# Patient Record
Sex: MALE | Race: WHITE | NOT HISPANIC OR LATINO | Employment: FULL TIME | URBAN - METROPOLITAN AREA
[De-identification: names, ages, dates, MRNs, and addresses within clinical notes are randomized per-mention and may not be internally consistent; named-entity substitution may affect disease eponyms.]

---

## 2023-03-21 DIAGNOSIS — Z00.00 HEALTHCARE MAINTENANCE: ICD-10-CM

## 2023-03-21 DIAGNOSIS — E78.5 HYPERLIPIDEMIA, UNSPECIFIED HYPERLIPIDEMIA TYPE: Primary | ICD-10-CM

## 2023-03-21 PROBLEM — K64.9 HEMORRHOID: Status: ACTIVE | Noted: 2023-03-21

## 2023-03-21 PROBLEM — F90.9 ADHD: Status: ACTIVE | Noted: 2023-03-21

## 2023-03-21 PROBLEM — F41.9 ANXIETY: Status: ACTIVE | Noted: 2023-03-21

## 2023-03-21 PROBLEM — K22.70 BARRETT'S ESOPHAGUS: Status: ACTIVE | Noted: 2023-03-21

## 2023-03-21 PROBLEM — H52.4 HYPEROPIA WITH PRESBYOPIA OF BOTH EYES: Status: ACTIVE | Noted: 2023-03-21

## 2023-03-21 PROBLEM — I86.1 VARICOCELE: Status: ACTIVE | Noted: 2023-03-21

## 2023-03-21 PROBLEM — E78.1 HYPERTRIGLYCERIDEMIA: Status: ACTIVE | Noted: 2023-03-21

## 2023-03-21 PROBLEM — M65.979 SYNOVITIS OF TOE: Status: ACTIVE | Noted: 2023-03-21

## 2023-03-21 PROBLEM — R07.89 ATYPICAL CHEST PAIN: Status: ACTIVE | Noted: 2023-03-21

## 2023-03-21 PROBLEM — H18.529 ABM (ANTERIOR BASEMENT MEMBRANE) DYSTROPHY: Status: ACTIVE | Noted: 2023-03-21

## 2023-03-21 PROBLEM — H17.9 CORNEAL SCAR, LEFT EYE: Status: ACTIVE | Noted: 2023-03-21

## 2023-03-21 PROBLEM — M79.673 FOOT PAIN: Status: ACTIVE | Noted: 2023-03-21

## 2023-03-21 PROBLEM — K64.9 BLEEDING HEMORRHOIDS: Status: ACTIVE | Noted: 2023-03-21

## 2023-03-21 PROBLEM — K21.9 CHRONIC GERD: Status: ACTIVE | Noted: 2023-03-21

## 2023-03-21 PROBLEM — H52.03 HYPEROPIA WITH PRESBYOPIA OF BOTH EYES: Status: ACTIVE | Noted: 2023-03-21

## 2023-03-21 PROBLEM — K14.6 BURNING MOUTH SYNDROME: Status: ACTIVE | Noted: 2023-03-21

## 2023-03-21 PROBLEM — L98.9 SKIN LESION: Status: ACTIVE | Noted: 2023-03-21

## 2023-03-21 PROBLEM — K14.6 BURNING TONGUE: Status: ACTIVE | Noted: 2023-03-21

## 2023-03-21 PROBLEM — M25.519 SHOULDER PAIN: Status: ACTIVE | Noted: 2023-03-21

## 2023-03-21 PROBLEM — M65.9 SYNOVITIS OF TOE: Status: ACTIVE | Noted: 2023-03-21

## 2023-03-21 PROBLEM — M19.079 1ST MTP ARTHRITIS: Status: ACTIVE | Noted: 2023-03-21

## 2023-03-21 PROBLEM — K58.0 IRRITABLE BOWEL SYNDROME WITH DIARRHEA: Status: ACTIVE | Noted: 2023-03-21

## 2023-03-21 PROBLEM — I10 HYPERTENSION: Status: ACTIVE | Noted: 2023-03-21

## 2023-03-21 PROBLEM — R52 BODY ACHES: Status: ACTIVE | Noted: 2023-03-21

## 2023-03-21 PROBLEM — M77.01 GOLFERS ELBOW OF RIGHT UPPER EXTREMITY: Status: ACTIVE | Noted: 2023-03-21

## 2023-03-21 PROBLEM — M25.579 ANKLE JOINT PAIN: Status: ACTIVE | Noted: 2023-03-21

## 2023-03-21 PROBLEM — R10.9 ABDOMINAL PAIN: Status: ACTIVE | Noted: 2023-03-21

## 2023-03-21 PROBLEM — M54.50 LOW BACK PAIN: Status: ACTIVE | Noted: 2023-03-21

## 2023-03-21 PROBLEM — B35.4 TINEA CORPORIS: Status: ACTIVE | Noted: 2023-03-21

## 2023-03-21 PROBLEM — H52.13 MYOPIA, BILATERAL: Status: ACTIVE | Noted: 2023-03-21

## 2023-03-21 PROBLEM — L30.9 DERMATITIS: Status: ACTIVE | Noted: 2023-03-21

## 2023-03-21 PROBLEM — R19.7 DIARRHEA: Status: ACTIVE | Noted: 2023-03-21

## 2023-03-21 RX ORDER — AMLODIPINE BESYLATE 10 MG/1
1 TABLET ORAL DAILY
COMMUNITY
Start: 2019-08-08 | End: 2023-03-28

## 2023-03-21 RX ORDER — TRIAMCINOLONE ACETONIDE 1 MG/G
CREAM TOPICAL 3 TIMES DAILY
COMMUNITY
Start: 2022-06-23 | End: 2024-04-18 | Stop reason: ALTCHOICE

## 2023-03-21 RX ORDER — IBUPROFEN 800 MG/1
1 TABLET ORAL EVERY 6 HOURS PRN
COMMUNITY
Start: 2019-10-30 | End: 2023-06-27

## 2023-03-21 RX ORDER — HYDROCHLOROTHIAZIDE 12.5 MG/1
1 TABLET ORAL DAILY
COMMUNITY
Start: 2019-09-12 | End: 2023-05-31 | Stop reason: SDUPTHER

## 2023-03-21 RX ORDER — LISINOPRIL 10 MG/1
1 TABLET ORAL DAILY
COMMUNITY
Start: 2020-06-13 | End: 2023-03-28

## 2023-03-23 ENCOUNTER — APPOINTMENT (OUTPATIENT)
Dept: PRIMARY CARE | Facility: CLINIC | Age: 48
End: 2023-03-23
Payer: COMMERCIAL

## 2023-03-27 DIAGNOSIS — I10 HYPERTENSION, UNSPECIFIED TYPE: ICD-10-CM

## 2023-03-28 RX ORDER — AMLODIPINE BESYLATE 10 MG/1
10 TABLET ORAL DAILY
Qty: 90 TABLET | Refills: 1 | Status: SHIPPED | OUTPATIENT
Start: 2023-03-28 | End: 2023-09-13 | Stop reason: SDUPTHER

## 2023-03-28 RX ORDER — LISINOPRIL 10 MG/1
10 TABLET ORAL DAILY
Qty: 90 TABLET | Refills: 1 | Status: SHIPPED | OUTPATIENT
Start: 2023-03-28 | End: 2023-09-13 | Stop reason: SDUPTHER

## 2023-05-31 DIAGNOSIS — I15.9 SECONDARY HYPERTENSION: ICD-10-CM

## 2023-05-31 RX ORDER — HYDROCHLOROTHIAZIDE 12.5 MG/1
12.5 TABLET ORAL DAILY
Qty: 90 TABLET | Refills: 3 | Status: SHIPPED | OUTPATIENT
Start: 2023-05-31 | End: 2023-06-06 | Stop reason: SDUPTHER

## 2023-06-06 DIAGNOSIS — I15.9 SECONDARY HYPERTENSION: ICD-10-CM

## 2023-06-06 RX ORDER — HYDROCHLOROTHIAZIDE 12.5 MG/1
12.5 TABLET ORAL DAILY
Qty: 90 TABLET | Refills: 3 | Status: SHIPPED | OUTPATIENT
Start: 2023-06-06 | End: 2023-09-13 | Stop reason: SDUPTHER

## 2023-06-27 DIAGNOSIS — M25.579 ARTHRALGIA OF ANKLE, UNSPECIFIED LATERALITY: ICD-10-CM

## 2023-06-27 RX ORDER — IBUPROFEN 800 MG/1
800 TABLET ORAL EVERY 6 HOURS PRN
Qty: 60 TABLET | Refills: 1 | Status: SHIPPED | OUTPATIENT
Start: 2023-06-27 | End: 2023-09-13 | Stop reason: SDUPTHER

## 2023-09-06 ENCOUNTER — LAB (OUTPATIENT)
Dept: LAB | Facility: LAB | Age: 48
End: 2023-09-06
Payer: COMMERCIAL

## 2023-09-06 ENCOUNTER — TELEPHONE (OUTPATIENT)
Dept: PRIMARY CARE | Facility: CLINIC | Age: 48
End: 2023-09-06

## 2023-09-06 DIAGNOSIS — Z00.00 HEALTHCARE MAINTENANCE: ICD-10-CM

## 2023-09-06 DIAGNOSIS — M10.9 GOUT, UNSPECIFIED CAUSE, UNSPECIFIED CHRONICITY, UNSPECIFIED SITE: Primary | ICD-10-CM

## 2023-09-06 DIAGNOSIS — E78.5 HYPERLIPIDEMIA, UNSPECIFIED HYPERLIPIDEMIA TYPE: ICD-10-CM

## 2023-09-06 LAB
ALANINE AMINOTRANSFERASE (SGPT) (U/L) IN SER/PLAS: 11 U/L (ref 10–52)
ALBUMIN (G/DL) IN SER/PLAS: 4.7 G/DL (ref 3.4–5)
ALKALINE PHOSPHATASE (U/L) IN SER/PLAS: 44 U/L (ref 33–120)
ANION GAP IN SER/PLAS: 15 MMOL/L (ref 10–20)
ASPARTATE AMINOTRANSFERASE (SGOT) (U/L) IN SER/PLAS: 18 U/L (ref 9–39)
BASOPHILS (10*3/UL) IN BLOOD BY AUTOMATED COUNT: 0.03 X10E9/L (ref 0–0.1)
BASOPHILS/100 LEUKOCYTES IN BLOOD BY AUTOMATED COUNT: 0.5 % (ref 0–2)
BILIRUBIN TOTAL (MG/DL) IN SER/PLAS: 1.2 MG/DL (ref 0–1.2)
CALCIUM (MG/DL) IN SER/PLAS: 9.6 MG/DL (ref 8.6–10.6)
CARBON DIOXIDE, TOTAL (MMOL/L) IN SER/PLAS: 27 MMOL/L (ref 21–32)
CHLORIDE (MMOL/L) IN SER/PLAS: 101 MMOL/L (ref 98–107)
CHOLESTEROL (MG/DL) IN SER/PLAS: 215 MG/DL (ref 0–199)
CHOLESTEROL IN HDL (MG/DL) IN SER/PLAS: 67.8 MG/DL
CHOLESTEROL/HDL RATIO: 3.2
CREATININE (MG/DL) IN SER/PLAS: 0.73 MG/DL (ref 0.5–1.3)
EOSINOPHILS (10*3/UL) IN BLOOD BY AUTOMATED COUNT: 0.28 X10E9/L (ref 0–0.7)
EOSINOPHILS/100 LEUKOCYTES IN BLOOD BY AUTOMATED COUNT: 5.1 % (ref 0–6)
ERYTHROCYTE DISTRIBUTION WIDTH (RATIO) BY AUTOMATED COUNT: 11.8 % (ref 11.5–14.5)
ERYTHROCYTE MEAN CORPUSCULAR HEMOGLOBIN CONCENTRATION (G/DL) BY AUTOMATED: 33.1 G/DL (ref 32–36)
ERYTHROCYTE MEAN CORPUSCULAR VOLUME (FL) BY AUTOMATED COUNT: 95 FL (ref 80–100)
ERYTHROCYTES (10*6/UL) IN BLOOD BY AUTOMATED COUNT: 4.64 X10E12/L (ref 4.5–5.9)
GFR MALE: >90 ML/MIN/1.73M2
GLUCOSE (MG/DL) IN SER/PLAS: 93 MG/DL (ref 74–99)
HEMATOCRIT (%) IN BLOOD BY AUTOMATED COUNT: 44.1 % (ref 41–52)
HEMOGLOBIN (G/DL) IN BLOOD: 14.6 G/DL (ref 13.5–17.5)
IMMATURE GRANULOCYTES/100 LEUKOCYTES IN BLOOD BY AUTOMATED COUNT: 0.5 % (ref 0–0.9)
LDL: 103 MG/DL (ref 0–99)
LEUKOCYTES (10*3/UL) IN BLOOD BY AUTOMATED COUNT: 5.5 X10E9/L (ref 4.4–11.3)
LYMPHOCYTES (10*3/UL) IN BLOOD BY AUTOMATED COUNT: 0.94 X10E9/L (ref 1.2–4.8)
LYMPHOCYTES/100 LEUKOCYTES IN BLOOD BY AUTOMATED COUNT: 17 % (ref 13–44)
MONOCYTES (10*3/UL) IN BLOOD BY AUTOMATED COUNT: 0.5 X10E9/L (ref 0.1–1)
MONOCYTES/100 LEUKOCYTES IN BLOOD BY AUTOMATED COUNT: 9.1 % (ref 2–10)
NEUTROPHILS (10*3/UL) IN BLOOD BY AUTOMATED COUNT: 3.74 X10E9/L (ref 1.2–7.7)
NEUTROPHILS/100 LEUKOCYTES IN BLOOD BY AUTOMATED COUNT: 67.8 % (ref 40–80)
NON HDL CHOLESTEROL: 147 MG/DL
NRBC (PER 100 WBCS) BY AUTOMATED COUNT: 0 /100 WBC (ref 0–0)
PLATELETS (10*3/UL) IN BLOOD AUTOMATED COUNT: 181 X10E9/L (ref 150–450)
POTASSIUM (MMOL/L) IN SER/PLAS: 4.4 MMOL/L (ref 3.5–5.3)
PROTEIN TOTAL: 7.1 G/DL (ref 6.4–8.2)
SODIUM (MMOL/L) IN SER/PLAS: 139 MMOL/L (ref 136–145)
TRIGLYCERIDE (MG/DL) IN SER/PLAS: 221 MG/DL (ref 0–149)
UREA NITROGEN (MG/DL) IN SER/PLAS: 12 MG/DL (ref 6–23)
VLDL: 44 MG/DL (ref 0–40)

## 2023-09-06 PROCEDURE — 36415 COLL VENOUS BLD VENIPUNCTURE: CPT

## 2023-09-06 PROCEDURE — 80053 COMPREHEN METABOLIC PANEL: CPT

## 2023-09-06 PROCEDURE — 80061 LIPID PANEL: CPT

## 2023-09-06 PROCEDURE — 85025 COMPLETE CBC W/AUTO DIFF WBC: CPT

## 2023-09-06 NOTE — TELEPHONE ENCOUNTER
Patient would like to know if you could put in orders in for blood work uric acid before his appt on 9/13.

## 2023-09-08 ENCOUNTER — LAB (OUTPATIENT)
Dept: LAB | Facility: LAB | Age: 48
End: 2023-09-08
Payer: COMMERCIAL

## 2023-09-08 DIAGNOSIS — M10.9 GOUT, UNSPECIFIED CAUSE, UNSPECIFIED CHRONICITY, UNSPECIFIED SITE: ICD-10-CM

## 2023-09-08 LAB — URATE (MG/DL) IN SER/PLAS: 6.3 MG/DL (ref 4–7.5)

## 2023-09-08 PROCEDURE — 36415 COLL VENOUS BLD VENIPUNCTURE: CPT

## 2023-09-08 PROCEDURE — 84550 ASSAY OF BLOOD/URIC ACID: CPT

## 2023-09-13 ENCOUNTER — OFFICE VISIT (OUTPATIENT)
Dept: PRIMARY CARE | Facility: CLINIC | Age: 48
End: 2023-09-13
Payer: COMMERCIAL

## 2023-09-13 VITALS
BODY MASS INDEX: 29.15 KG/M2 | OXYGEN SATURATION: 98 % | SYSTOLIC BLOOD PRESSURE: 128 MMHG | DIASTOLIC BLOOD PRESSURE: 85 MMHG | WEIGHT: 209 LBS | HEART RATE: 96 BPM | TEMPERATURE: 97.8 F

## 2023-09-13 DIAGNOSIS — M25.579 ARTHRALGIA OF ANKLE, UNSPECIFIED LATERALITY: ICD-10-CM

## 2023-09-13 DIAGNOSIS — E66.3 OVERWEIGHT (BMI 25.0-29.9): ICD-10-CM

## 2023-09-13 DIAGNOSIS — I10 HYPERTENSION, UNSPECIFIED TYPE: ICD-10-CM

## 2023-09-13 DIAGNOSIS — Z00.00 HEALTHCARE MAINTENANCE: Primary | ICD-10-CM

## 2023-09-13 DIAGNOSIS — I15.9 SECONDARY HYPERTENSION: ICD-10-CM

## 2023-09-13 DIAGNOSIS — R21 RASH: ICD-10-CM

## 2023-09-13 PROCEDURE — 99396 PREV VISIT EST AGE 40-64: CPT | Performed by: STUDENT IN AN ORGANIZED HEALTH CARE EDUCATION/TRAINING PROGRAM

## 2023-09-13 PROCEDURE — 3079F DIAST BP 80-89 MM HG: CPT | Performed by: STUDENT IN AN ORGANIZED HEALTH CARE EDUCATION/TRAINING PROGRAM

## 2023-09-13 PROCEDURE — 90686 IIV4 VACC NO PRSV 0.5 ML IM: CPT | Performed by: STUDENT IN AN ORGANIZED HEALTH CARE EDUCATION/TRAINING PROGRAM

## 2023-09-13 PROCEDURE — 90471 IMMUNIZATION ADMIN: CPT | Performed by: STUDENT IN AN ORGANIZED HEALTH CARE EDUCATION/TRAINING PROGRAM

## 2023-09-13 PROCEDURE — 1036F TOBACCO NON-USER: CPT | Performed by: STUDENT IN AN ORGANIZED HEALTH CARE EDUCATION/TRAINING PROGRAM

## 2023-09-13 PROCEDURE — 3074F SYST BP LT 130 MM HG: CPT | Performed by: STUDENT IN AN ORGANIZED HEALTH CARE EDUCATION/TRAINING PROGRAM

## 2023-09-13 RX ORDER — HYDROCHLOROTHIAZIDE 12.5 MG/1
12.5 TABLET ORAL DAILY
Qty: 90 TABLET | Refills: 3 | Status: SHIPPED | OUTPATIENT
Start: 2023-09-13 | End: 2024-05-24 | Stop reason: SDUPTHER

## 2023-09-13 RX ORDER — IBUPROFEN 800 MG/1
800 TABLET ORAL EVERY 6 HOURS PRN
Qty: 60 TABLET | Refills: 1 | Status: SHIPPED | OUTPATIENT
Start: 2023-09-13 | End: 2024-05-28 | Stop reason: SDUPTHER

## 2023-09-13 RX ORDER — LISINOPRIL 10 MG/1
10 TABLET ORAL DAILY
Qty: 90 TABLET | Refills: 3 | Status: SHIPPED | OUTPATIENT
Start: 2023-09-13 | End: 2023-09-26

## 2023-09-13 RX ORDER — AMLODIPINE BESYLATE 10 MG/1
10 TABLET ORAL DAILY
Qty: 90 TABLET | Refills: 3 | Status: SHIPPED | OUTPATIENT
Start: 2023-09-13 | End: 2023-09-26

## 2023-09-13 NOTE — PROGRESS NOTES
Subjective   Patient ID: Shant Rivera is a 47 y.o. male who presents for Annual Exam.    HPI   Re: HTN - at goal. Reports no sx high or low from HTN; denies blurry vision, HA, dizziness LoC CP SoB Munoz and leg swelling     Re: gout - had a flare last week. Resolving now with his high dose NSAID therapy.     Re:  HM - due for flu shot. Colon screen UTD, repeat 2030. PSA no longer indicated.     PMHx, FHx, Social Hx, Surg Hx personally reviewed at this appointment. No pertinent findings and/or changes from prior (if applicable).     ROS: Denies wt gain/loss f/c HA LoC CP SOB NVDC. See HPI above, and scanned sheet (if applicable). All other systems are reviewed and are without complaint.     Review of Systems    Objective   /85   Pulse 96   Temp 36.6 °C (97.8 °F)   Wt 94.8 kg (209 lb)   SpO2 98%   BMI 29.15 kg/m²     Physical Exam  Gen: well developed in NAD. AAO x3.  HEENT: NC/AT. Anicteric sclera, symmetric pupils. MMM no thrush.  Neck: Soft, supple. No LAD. No goiter.   CV: RRR nl s1s2 no m/r/g  Pulm: CTAB no w/r/r, good air exchange  GI: soft NTND BS+ no hsm  Ext: WWP no edema  Neuro: II-XII grossly intact, nonfocal systemic findings  MSK: 5/5 strength b/l UE and LE  Gait: unremarkable    Lab Results   Component Value Date    WBC 5.5 09/06/2023    HGB 14.6 09/06/2023    HCT 44.1 09/06/2023     09/06/2023    CHOL 215 (H) 09/06/2023    TRIG 221 (H) 09/06/2023    HDL 67.8 09/06/2023    ALT 11 09/06/2023    AST 18 09/06/2023     09/06/2023    K 4.4 09/06/2023     09/06/2023    CREATININE 0.73 09/06/2023    BUN 12 09/06/2023    CO2 27 09/06/2023    HGBA1C 5.0 02/01/2021     par    Electrocardiogram 12 Lead  Normal sinus rhythm  Septal infarct , age undetermined  Abnormal ECG  No previous ECGs available  Confirmed by ALAINA SAUCEDO MD ([id number]) on 7/20/2021 2:00:56 PM         Assessment/Plan   # HTN: at/near goal  - continue current regimen  - routine lab work if not recent  - continue  lifestyle modifications    # Gout/Psuedogout  - PRN NSAIDs  - uric acid level is normal    # Overweight  - counselled on wt loss via diet, exercise, and other lifestyle modifications     # Health Maintenance  - routine blood work  - Colon Cancer Screening: UTD, repeat 2030  - PSA: Not yet indicated   - Immunizations:  flu shot  - AAA screening:  Not yet indicated   Problem List Items Addressed This Visit       Ankle joint pain    Relevant Medications    ibuprofen 800 mg tablet    Hypertension    Relevant Medications    amLODIPine (Norvasc) 10 mg tablet    lisinopril 10 mg tablet    hydroCHLOROthiazide (HYDRODiuril) 12.5 mg tablet     Other Visit Diagnoses       Healthcare maintenance    -  Primary    Rash        Relevant Orders    Referral to Dermatology

## 2023-09-13 NOTE — PATIENT INSTRUCTIONS
Your blood work is up to date; no need for additional draw at this appointment    I have renewed your chronic medications today     You received your flu shot today!    See me yearly for a complete physical exam, and sooner as needed for sick visits

## 2023-09-23 DIAGNOSIS — I10 HYPERTENSION, UNSPECIFIED TYPE: ICD-10-CM

## 2023-09-26 RX ORDER — LISINOPRIL 10 MG/1
10 TABLET ORAL DAILY
Qty: 90 TABLET | Refills: 3 | Status: SHIPPED | OUTPATIENT
Start: 2023-09-26

## 2023-09-26 RX ORDER — AMLODIPINE BESYLATE 10 MG/1
TABLET ORAL
Qty: 90 TABLET | Refills: 3 | Status: SHIPPED | OUTPATIENT
Start: 2023-09-26

## 2023-10-16 ENCOUNTER — HOSPITAL ENCOUNTER (OUTPATIENT)
Dept: RADIOLOGY | Facility: HOSPITAL | Age: 48
Discharge: HOME | End: 2023-10-16
Payer: COMMERCIAL

## 2023-10-16 ENCOUNTER — OFFICE VISIT (OUTPATIENT)
Dept: ORTHOPEDIC SURGERY | Facility: HOSPITAL | Age: 48
End: 2023-10-16
Payer: COMMERCIAL

## 2023-10-16 DIAGNOSIS — M25.519 SHOULDER PAIN, UNSPECIFIED CHRONICITY, UNSPECIFIED LATERALITY: ICD-10-CM

## 2023-10-16 PROCEDURE — 99214 OFFICE O/P EST MOD 30 MIN: CPT | Performed by: ORTHOPAEDIC SURGERY

## 2023-10-16 PROCEDURE — 73030 X-RAY EXAM OF SHOULDER: CPT | Mod: RT,FY

## 2023-10-16 PROCEDURE — 99204 OFFICE O/P NEW MOD 45 MIN: CPT | Performed by: ORTHOPAEDIC SURGERY

## 2023-10-16 PROCEDURE — 73030 X-RAY EXAM OF SHOULDER: CPT | Mod: RIGHT SIDE | Performed by: RADIOLOGY

## 2023-10-16 PROCEDURE — 1036F TOBACCO NON-USER: CPT | Performed by: ORTHOPAEDIC SURGERY

## 2023-10-16 NOTE — PROGRESS NOTES
Patient injured his shoulder when pulling type with his arms downward.  He felt a sudden grinding pain has had pain and recurrent popping ever since.  No serious problems with his shoulder in the past.    The patient is pleasant and cooperative.  The patient is alert and oriented ×3.  Auditory function is intact.  The patient is a good historian.  The patient is not in acute distress.  Eye exam significant for nonicteric sclera, intact ocular muscle movement.  Breathing is rhythmic symmetric and nonlabored.  Right shoulder patient has nearly full range of motion but pain with extremes of range of motion particularly internal rotation cross body adduction and a positive [place]'s test.  Motor power is well-preserved abduction internal X rotation 5/5 light touch sensation of the deltoid is intact  strength 5/5.  Peripheral edema.  Integument intact with no lesions scars lacerations abrasions or contusions.    Radiographs appear normal no fractures or Subluxation arthritic degenerative changes subacromial glenohumeral spaces are well-preserved.    Labrum tear    I am concerned the patient has a glenoid labrum tear.  It is unlikely he is going to recover with any amount of therapy.  Given the strongly positive exam I recommended further evaluation by MRI scan as an MRI arthrogram.    Patient will follow-up after scan has been obtained.

## 2023-11-09 ENCOUNTER — HOSPITAL ENCOUNTER (OUTPATIENT)
Dept: RADIOLOGY | Facility: HOSPITAL | Age: 48
Discharge: HOME | End: 2023-11-09
Payer: COMMERCIAL

## 2023-11-09 DIAGNOSIS — M25.519 SHOULDER PAIN, UNSPECIFIED CHRONICITY, UNSPECIFIED LATERALITY: ICD-10-CM

## 2023-11-09 PROCEDURE — 73222 MRI JOINT UPR EXTREM W/DYE: CPT | Mod: RIGHT SIDE | Performed by: RADIOLOGY

## 2023-11-09 PROCEDURE — 23350 INJECTION FOR SHOULDER X-RAY: CPT | Mod: RT

## 2023-11-09 PROCEDURE — A9575 INJ GADOTERATE MEGLUMI 0.1ML: HCPCS | Performed by: ORTHOPAEDIC SURGERY

## 2023-11-09 PROCEDURE — 77002 NEEDLE LOCALIZATION BY XRAY: CPT | Mod: RIGHT SIDE | Performed by: RADIOLOGY

## 2023-11-09 PROCEDURE — A9579 GAD-BASE MR CONTRAST NOS,1ML: HCPCS | Performed by: ORTHOPAEDIC SURGERY

## 2023-11-09 PROCEDURE — 23350 INJECTION FOR SHOULDER X-RAY: CPT | Mod: RIGHT SIDE | Performed by: RADIOLOGY

## 2023-11-09 PROCEDURE — 2500000004 HC RX 250 GENERAL PHARMACY W/ HCPCS (ALT 636 FOR OP/ED): Performed by: ORTHOPAEDIC SURGERY

## 2023-11-09 PROCEDURE — 2550000001 HC RX 255 CONTRASTS: Performed by: ORTHOPAEDIC SURGERY

## 2023-11-09 RX ORDER — LIDOCAINE HYDROCHLORIDE 10 MG/ML
4 INJECTION, SOLUTION EPIDURAL; INFILTRATION; INTRACAUDAL; PERINEURAL ONCE
Status: DISCONTINUED | OUTPATIENT
Start: 2023-11-09 | End: 2023-11-10 | Stop reason: HOSPADM

## 2023-11-09 RX ORDER — LIDOCAINE HYDROCHLORIDE 10 MG/ML
INJECTION INFILTRATION; PERINEURAL
Status: DISPENSED
Start: 2023-11-09 | End: 2023-11-09

## 2023-11-09 RX ORDER — GADOTERATE MEGLUMINE 376.9 MG/ML
0.1 INJECTION INTRAVENOUS
Status: COMPLETED | OUTPATIENT
Start: 2023-11-09 | End: 2023-11-09

## 2023-11-09 RX ADMIN — GADOTERATE MEGLUMINE 0.1 ML: 376.9 INJECTION INTRAVENOUS at 09:50

## 2023-11-09 RX ADMIN — IOHEXOL 4 MG: 240 INJECTION, SOLUTION INTRATHECAL; INTRAVASCULAR; INTRAVENOUS; ORAL at 11:00

## 2023-11-09 RX ADMIN — GADODIAMIDE: 287 INJECTION INTRAVENOUS at 11:00

## 2023-11-09 NOTE — PROCEDURES
Right Shoulder MR Arthrogram.      9:50 AM.  November 9th, 2023.    The risks, benefits and alternatives of the procedure were discussed with the patient. The patient was given the chance to ask questions, and all were answered prior to proceeding. At this time, written informed consent was obtained.      The patient was placed in the supine position on the fluoroscopic table and was prepped and draped in the usual sterile fashion. The [right  shoulder] joint was localized under fluoroscopy. Lidocaine, [10] ml was used for local anesthesia.      Under fluoroscopic guidance a 20 gauge needle was inserted into the [right shoulder] joint. [8] mL of a solution containing lidocaine, Omnipaque 240 iodinated contrast and Dotarem gadolinium contrast was injected into the [right ] joint.            The patient tolerated the procedure well and no immediate complication was noted.      Total fluoroscopy time was [7] sec.     Procedure was performed by [Hank Restrepo MD].

## 2023-11-10 ENCOUNTER — OFFICE VISIT (OUTPATIENT)
Dept: UROLOGY | Facility: HOSPITAL | Age: 48
End: 2023-11-10
Payer: COMMERCIAL

## 2023-11-10 ENCOUNTER — OFFICE VISIT (OUTPATIENT)
Dept: ORTHOPEDIC SURGERY | Facility: HOSPITAL | Age: 48
End: 2023-11-10
Payer: COMMERCIAL

## 2023-11-10 DIAGNOSIS — R35.0 URINARY FREQUENCY: ICD-10-CM

## 2023-11-10 DIAGNOSIS — S43.003A SUBLUXATION OF TENDON OF LONG HEAD OF BICEPS: Primary | ICD-10-CM

## 2023-11-10 DIAGNOSIS — N52.9 ERECTILE DYSFUNCTION, UNSPECIFIED ERECTILE DYSFUNCTION TYPE: ICD-10-CM

## 2023-11-10 DIAGNOSIS — N40.1 BENIGN LOCALIZED PROSTATIC HYPERPLASIA WITH LOWER URINARY TRACT SYMPTOMS (LUTS): Primary | ICD-10-CM

## 2023-11-10 PROCEDURE — 99213 OFFICE O/P EST LOW 20 MIN: CPT | Mod: 25 | Performed by: ORTHOPAEDIC SURGERY

## 2023-11-10 PROCEDURE — 1036F TOBACCO NON-USER: CPT | Performed by: ORTHOPAEDIC SURGERY

## 2023-11-10 PROCEDURE — 99214 OFFICE O/P EST MOD 30 MIN: CPT | Performed by: UROLOGY

## 2023-11-10 PROCEDURE — 99213 OFFICE O/P EST LOW 20 MIN: CPT | Performed by: ORTHOPAEDIC SURGERY

## 2023-11-10 PROCEDURE — 99204 OFFICE O/P NEW MOD 45 MIN: CPT | Performed by: UROLOGY

## 2023-11-10 PROCEDURE — 3079F DIAST BP 80-89 MM HG: CPT | Performed by: ORTHOPAEDIC SURGERY

## 2023-11-10 PROCEDURE — 1036F TOBACCO NON-USER: CPT | Performed by: UROLOGY

## 2023-11-10 PROCEDURE — 3074F SYST BP LT 130 MM HG: CPT | Performed by: UROLOGY

## 2023-11-10 PROCEDURE — 3074F SYST BP LT 130 MM HG: CPT | Performed by: ORTHOPAEDIC SURGERY

## 2023-11-10 PROCEDURE — 3079F DIAST BP 80-89 MM HG: CPT | Performed by: UROLOGY

## 2023-11-10 RX ORDER — TADALAFIL 5 MG/1
5 TABLET ORAL DAILY
Qty: 30 TABLET | Refills: 11 | Status: SHIPPED | OUTPATIENT
Start: 2023-11-10 | End: 2024-11-04

## 2023-11-10 NOTE — PROGRESS NOTES
With regard to his shoulder but intermittently has sudden pains anteriorly.  He is here to review MRI scan.    MRI scan shows subluxing biceps tendon.  Intact rotator cuff.  Partial tear of the subscapularis.    We discussed significance of the findings of the MRI scan and the significance of the problem and subluxing biceps tendon the treatment options were discussed in detail I have recommend the patient consider surgery specifically right shoulder arthroscopy with debridement and open biceps tenodesis.  Explained the procedure and the rehabilitation sequence.  The patient is interested in pursuing this after the ski season.  We will schedule sometime in the spring.  If he changes his mind going to schedule for earlier if needed.    This was dictated using voice recognition software and not corrected for grammatical or spelling errors.

## 2023-11-10 NOTE — PROGRESS NOTES
"HPI  48-year-old male being seen for bothersome urinary symptoms and issues with erections.    LUTS  Urinary symptoms include: frequency, no hematuria, no UTIs, and unhappy with his urinary symptoms  No prior surgeries  No urinary medications.  He does drink a lot of water up to bedtime. Nocturia x3-4.    Erections: not working well, naive to meds;  has noted issues since his vasectomy 8 years ago.    Stones   No prior stones    Prostate Cancer Screening:   No family history of prostate cancer    No results found for: \"PSA\"                  Current Medications:  Current Outpatient Medications   Medication Sig Dispense Refill    amLODIPine (Norvasc) 10 mg tablet TAKE 1 TABLET(10 MG) BY MOUTH EVERY DAY AS DIRECTED 90 tablet 3    hydroCHLOROthiazide (HYDRODiuril) 12.5 mg tablet Take 1 tablet (12.5 mg) by mouth once daily. 90 tablet 3    ibuprofen 800 mg tablet Take 1 tablet (800 mg) by mouth every 6 hours if needed for mild pain (1 - 3). 60 tablet 1    lisinopril 10 mg tablet TAKE 1 TABLET(10 MG) BY MOUTH EVERY DAY 90 tablet 3    tadalafil (Cialis) 5 mg tablet Take 1 tablet (5 mg) by mouth once daily. 30 tablet 11    triamcinolone (Kenalog) 0.1 % cream Apply topically 3 times a day.       No current facility-administered medications for this visit.        Active Problems:  Shant Rivera is a 48 y.o. male with the following Problems and Medications.  Patient Active Problem List   Diagnosis    1st MTP arthritis    Abdominal pain    ABM (anterior basement membrane) dystrophy    ADHD    Ankle joint pain    Anxiety    Atypical chest pain    Edgar's esophagus    Bleeding hemorrhoids    Body aches    Burning mouth syndrome    Burning tongue    Chronic GERD    Corneal scar, left eye    Dermatitis    Diarrhea    Foot pain    Golfers elbow of right upper extremity    Hemorrhoid    HLD (hyperlipidemia)    Hyperopia with presbyopia of both eyes    Hypertension    Hypertriglyceridemia    Irritable bowel syndrome with diarrhea    " Low back pain    Myopia, bilateral    Shoulder pain    Skin lesion    Synovitis of toe    Tinea corporis    Varicocele     Current Outpatient Medications   Medication Sig Dispense Refill    amLODIPine (Norvasc) 10 mg tablet TAKE 1 TABLET(10 MG) BY MOUTH EVERY DAY AS DIRECTED 90 tablet 3    hydroCHLOROthiazide (HYDRODiuril) 12.5 mg tablet Take 1 tablet (12.5 mg) by mouth once daily. 90 tablet 3    ibuprofen 800 mg tablet Take 1 tablet (800 mg) by mouth every 6 hours if needed for mild pain (1 - 3). 60 tablet 1    lisinopril 10 mg tablet TAKE 1 TABLET(10 MG) BY MOUTH EVERY DAY 90 tablet 3    tadalafil (Cialis) 5 mg tablet Take 1 tablet (5 mg) by mouth once daily. 30 tablet 11    triamcinolone (Kenalog) 0.1 % cream Apply topically 3 times a day.       No current facility-administered medications for this visit.       PMH:  Past Medical History:   Diagnosis Date    Disease of esophagus, unspecified 08/15/2013    Disorder of esophagus    Essential (primary) hypertension 05/12/2021    Hypertension    Other conditions influencing health status 03/26/2013    Attention-deficit Hyperactivity Disorder    Personal history of malignant neoplasm, unspecified     History of malignant neoplasm    Personal history of other diseases of the digestive system     History of esophageal reflux    Personal history of other diseases of the respiratory system     Personal history of asthma    Polyp of colon     Benign colonic polyp    Unspecified corneal scar and opacity     Corneal scar, left eye       PSH:  Past Surgical History:   Procedure Laterality Date    GASTRIC FUNDOPLICATION  09/03/2014    Esophagogastric Fundoplasty Nissen Fundoplication    HERNIA REPAIR  09/03/2014    Inguinal Hernia Repair    OTHER SURGICAL HISTORY  11/16/2021    Strabismus surgery       FMH:  Family History   Problem Relation Name Age of Onset    Breast cancer Mother      Brain cancer Father      Prostate cancer Maternal Grandfather      Esophageal cancer  Paternal Grandmother         SHx:  Social History     Tobacco Use    Smoking status: Former     Packs/day: 1.00     Years: 10.00     Additional pack years: 0.00     Total pack years: 10.00     Types: Cigarettes    Smokeless tobacco: Never   Substance Use Topics    Alcohol use: Yes     Alcohol/week: 21.0 standard drinks of alcohol     Types: 21 Standard drinks or equivalent per week    Drug use: Never       Allergies:  Allergies   Allergen Reactions    Penicillins Unknown           Assesment/Plan  We discussed the mechanics of voiding.  We discussed fluid management and behavioral modifications.  I recommended he reduce his fluid particularly before bedtime.  He is interested in trying medication.  We will try him on tadalafil 5 mg daily for both his urinary issues as well as his erectile issues.  Discussed side effects of contraindication to nitrates.  I will see him back in 3 months or telehealth, sooner as needed.      Scribe Attestation  By signing my name below, I, Mariely Hickman , Scribe   attest that this documentation has been prepared under the direction and in the presence of Soy Orozco MD.

## 2023-11-17 DIAGNOSIS — S43.003D: ICD-10-CM

## 2024-02-09 ENCOUNTER — APPOINTMENT (OUTPATIENT)
Dept: UROLOGY | Facility: HOSPITAL | Age: 49
End: 2024-02-09
Payer: COMMERCIAL

## 2024-02-22 ENCOUNTER — TELEMEDICINE (OUTPATIENT)
Dept: UROLOGY | Facility: HOSPITAL | Age: 49
End: 2024-02-22
Payer: COMMERCIAL

## 2024-02-22 DIAGNOSIS — N40.1 BENIGN LOCALIZED PROSTATIC HYPERPLASIA WITH LOWER URINARY TRACT SYMPTOMS (LUTS): ICD-10-CM

## 2024-02-22 DIAGNOSIS — N52.9 ERECTILE DYSFUNCTION, UNSPECIFIED ERECTILE DYSFUNCTION TYPE: ICD-10-CM

## 2024-02-22 DIAGNOSIS — R35.0 URINARY FREQUENCY: Primary | ICD-10-CM

## 2024-02-22 PROCEDURE — 1036F TOBACCO NON-USER: CPT | Performed by: UROLOGY

## 2024-02-22 PROCEDURE — 99214 OFFICE O/P EST MOD 30 MIN: CPT | Performed by: UROLOGY

## 2024-02-22 NOTE — PROGRESS NOTES
"HPI    48-year-old male being seen for bothersome urinary symptoms and issues with erections.     LUTS  Urinary symptoms include: frequency, no hematuria, no UTIs, and unhappy with his urinary symptoms  No prior surgeries  No urinary medications.  He does drink a lot of water up to bedtime. Nocturia x3-4.     Erections: not working well, naive to meds;  has noted issues since his vasectomy 8 years ago.     Stones   No prior stones     Prostate Cancer Screening:   No family history of prostate cancer    11/10/23 - seen in consult. Started on cialis 5mg for bph and ed. Discussed limiting fluids in the evening    2/22/24 - seen in follow up after started on tadalafil 5mg daily. urination is better. Urgency better, nocturia better. Erections are much better. Would like to come in for exam    No results found for: \"PSA\"      Current Medications:  Current Outpatient Medications   Medication Sig Dispense Refill    amLODIPine (Norvasc) 10 mg tablet TAKE 1 TABLET(10 MG) BY MOUTH EVERY DAY AS DIRECTED 90 tablet 3    hydroCHLOROthiazide (HYDRODiuril) 12.5 mg tablet Take 1 tablet (12.5 mg) by mouth once daily. 90 tablet 3    ibuprofen 800 mg tablet Take 1 tablet (800 mg) by mouth every 6 hours if needed for mild pain (1 - 3). 60 tablet 1    lisinopril 10 mg tablet TAKE 1 TABLET(10 MG) BY MOUTH EVERY DAY 90 tablet 3    tadalafil (Cialis) 5 mg tablet Take 1 tablet (5 mg) by mouth once daily. 30 tablet 11    triamcinolone (Kenalog) 0.1 % cream Apply topically 3 times a day.       No current facility-administered medications for this visit.        Active Problems:  Shant Rivera is a 48 y.o. male with the following Problems and Medications.  Patient Active Problem List   Diagnosis    1st MTP arthritis    Abdominal pain    ABM (anterior basement membrane) dystrophy    ADHD    Ankle joint pain    Anxiety    Atypical chest pain    Edgar's esophagus    Bleeding hemorrhoids    Body aches    Burning mouth syndrome    Burning tongue    " Chronic GERD    Corneal scar, left eye    Dermatitis    Diarrhea    Foot pain    Golfers elbow of right upper extremity    Hemorrhoid    HLD (hyperlipidemia)    Hyperopia with presbyopia of both eyes    Hypertension    Hypertriglyceridemia    Irritable bowel syndrome with diarrhea    Low back pain    Myopia, bilateral    Shoulder pain    Skin lesion    Synovitis of toe    Tinea corporis    Varicocele    Shoulder subluxation, unspecified laterality, subsequent encounter     Current Outpatient Medications   Medication Sig Dispense Refill    amLODIPine (Norvasc) 10 mg tablet TAKE 1 TABLET(10 MG) BY MOUTH EVERY DAY AS DIRECTED 90 tablet 3    hydroCHLOROthiazide (HYDRODiuril) 12.5 mg tablet Take 1 tablet (12.5 mg) by mouth once daily. 90 tablet 3    ibuprofen 800 mg tablet Take 1 tablet (800 mg) by mouth every 6 hours if needed for mild pain (1 - 3). 60 tablet 1    lisinopril 10 mg tablet TAKE 1 TABLET(10 MG) BY MOUTH EVERY DAY 90 tablet 3    tadalafil (Cialis) 5 mg tablet Take 1 tablet (5 mg) by mouth once daily. 30 tablet 11    triamcinolone (Kenalog) 0.1 % cream Apply topically 3 times a day.       No current facility-administered medications for this visit.       PMH:  Past Medical History:   Diagnosis Date    Disease of esophagus, unspecified 08/15/2013    Disorder of esophagus    Essential (primary) hypertension 05/12/2021    Hypertension    Other conditions influencing health status 03/26/2013    Attention-deficit Hyperactivity Disorder    Personal history of malignant neoplasm, unspecified     History of malignant neoplasm    Personal history of other diseases of the digestive system     History of esophageal reflux    Personal history of other diseases of the respiratory system     Personal history of asthma    Polyp of colon     Benign colonic polyp    Unspecified corneal scar and opacity     Corneal scar, left eye       PSH:  Past Surgical History:   Procedure Laterality Date    GASTRIC FUNDOPLICATION   09/03/2014    Esophagogastric Fundoplasty Nissen Fundoplication    HERNIA REPAIR  09/03/2014    Inguinal Hernia Repair    OTHER SURGICAL HISTORY  11/16/2021    Strabismus surgery       FMH:  Family History   Problem Relation Name Age of Onset    Breast cancer Mother      Brain cancer Father      Prostate cancer Maternal Grandfather      Esophageal cancer Paternal Grandmother         SHx:  Social History     Tobacco Use    Smoking status: Former     Packs/day: 1.00     Years: 10.00     Additional pack years: 0.00     Total pack years: 10.00     Types: Cigarettes    Smokeless tobacco: Never   Substance Use Topics    Alcohol use: Yes     Alcohol/week: 21.0 standard drinks of alcohol     Types: 21 Standard drinks or equivalent per week    Drug use: Never       Allergies:  Allergies   Allergen Reactions    Penicillins Unknown       Assessment/Plan  Doing much better on tadalafil 5mg daily, both in terms of erectile function and urinary symptoms. Happy where he is at. Would like to come in for an exam, will arrange in next month or so.         Scribe Attestation  By signing my name below, I, Bria Lilly, attest that this documentation  has been prepared under the direction and in the presence of Soy Orozco MD.

## 2024-03-18 ENCOUNTER — PRE-ADMISSION TESTING (OUTPATIENT)
Dept: PREADMISSION TESTING | Facility: HOSPITAL | Age: 49
End: 2024-03-18
Payer: COMMERCIAL

## 2024-03-20 NOTE — PROGRESS NOTES
HPI    Shant Rivera is a 48 y.o. male who was initially evaluated for hemorrhoids. He was seen by Dr. Cervantes about 10 years ago without intervention and his hemorrhoids resolved on their own. On exam he had a skin tag suggestive of a fissure. On 7/30/21 he underwent EUA, excision of skin tag and biopsy. Pathology showing irritated and inflamed tissue. Saw Dr. Burgos about RUQ pain and HIDA scan was ordered.      He was last seen 5/2023 and at that time he had a chronic appearing area of lichenified skin in anterior perineum despite steroid treatment. He was referred to dermatology.    He is having irritation and bleeding with wiping. He is having 1-2 BM's per day. Stools are liquid. He is not taking any fiber powder. He spends a prolonged period of time on the toilet and sits on his phone. He did see dermatology and they gave him tacrolimus cream. He reports an improvement in his symptoms with the tacrolimus cream.      Colonoscopy 6/2020 ([facility])- Normal     Past Medical History:   Diagnosis Date    Disease of esophagus, unspecified 08/15/2013    Disorder of esophagus    Essential (primary) hypertension 05/12/2021    Hypertension    Other conditions influencing health status 03/26/2013    Attention-deficit Hyperactivity Disorder    Personal history of malignant neoplasm, unspecified     History of malignant neoplasm    Personal history of other diseases of the digestive system     History of esophageal reflux    Personal history of other diseases of the respiratory system     Personal history of asthma    Polyp of colon     Benign colonic polyp    Unspecified corneal scar and opacity     Corneal scar, left eye       Past Surgical History:   Procedure Laterality Date    GASTRIC FUNDOPLICATION  09/03/2014    Esophagogastric Fundoplasty Nissen Fundoplication    HERNIA REPAIR  09/03/2014    Inguinal Hernia Repair    OTHER SURGICAL HISTORY  11/16/2021    Strabismus surgery       Allergies   Allergen Reactions     Penicillins Unknown       Review of Systems   Constitutional:  Negative for activity change and appetite change.   Gastrointestinal:  Positive for anal bleeding and diarrhea.       Physical Exam  Vitals reviewed. Exam conducted with a chaperone present.   Constitutional:       Appearance: Normal appearance. He is normal weight.   Neurological:      Mental Status: He is alert.       Perineum:   Anterior area of lichen sclerosis with nonhealing biopsy site  JANAE normal     Assessment and Plan:   Nonhealing of biopsy. Ongoing diarrhea.   Discussed options including perineal hygiene (some steroid cream, aquaphor, gauze between buttocks) to minimize friction and moisture.   Discussed potential for excision with closure including risk of a larger non-healing wound as second choice if needed.

## 2024-03-21 ENCOUNTER — OFFICE VISIT (OUTPATIENT)
Dept: UROLOGY | Facility: HOSPITAL | Age: 49
End: 2024-03-21
Payer: COMMERCIAL

## 2024-03-21 DIAGNOSIS — K62.89 PAIN, ANAL: ICD-10-CM

## 2024-03-21 DIAGNOSIS — N40.1 BENIGN PROSTATIC HYPERPLASIA WITH URINARY FREQUENCY: Primary | ICD-10-CM

## 2024-03-21 DIAGNOSIS — R35.0 BENIGN PROSTATIC HYPERPLASIA WITH URINARY FREQUENCY: Primary | ICD-10-CM

## 2024-03-21 PROCEDURE — 99213 OFFICE O/P EST LOW 20 MIN: CPT | Performed by: UROLOGY

## 2024-03-21 PROCEDURE — 1036F TOBACCO NON-USER: CPT | Performed by: UROLOGY

## 2024-03-21 NOTE — PROGRESS NOTES
HPI  48-year-old male being seen for bothersome urinary symptoms and issues with erections.     LUTS  Urinary symptoms include: frequency, no hematuria, no UTIs, and unhappy with his urinary symptoms  No prior surgeries  No urinary medications.  He does drink a lot of water up to bedtime. Nocturia x3-4.     Erections: not working well, naive to meds;  has noted issues since his vasectomy 8 years ago.     Stones   No prior stones     Prostate Cancer Screening:   No family history of prostate cancer    11/10/23 - seen in consult. Started on cialis 5mg for bph and ed. Discussed limiting fluids in the evening    2/22/24 - seen in follow up after started on tadalafil 5mg daily. urination is better. Urgency better, nocturia better. Erections are much better. Would like to come in for exam    3/21/24 - seen today for exam, per patients request. Doing very well, no interval changes. History of hemorrhoid removal a few years ago, having pain in the anal area    Current Medications:  Current Outpatient Medications   Medication Sig Dispense Refill    amLODIPine (Norvasc) 10 mg tablet TAKE 1 TABLET(10 MG) BY MOUTH EVERY DAY AS DIRECTED 90 tablet 3    hydroCHLOROthiazide (HYDRODiuril) 12.5 mg tablet Take 1 tablet (12.5 mg) by mouth once daily. 90 tablet 3    ibuprofen 800 mg tablet Take 1 tablet (800 mg) by mouth every 6 hours if needed for mild pain (1 - 3). 60 tablet 1    lisinopril 10 mg tablet TAKE 1 TABLET(10 MG) BY MOUTH EVERY DAY 90 tablet 3    tadalafil (Cialis) 5 mg tablet Take 1 tablet (5 mg) by mouth once daily. 30 tablet 11    triamcinolone (Kenalog) 0.1 % cream Apply topically 3 times a day.       No current facility-administered medications for this visit.        Active Problems:  Shant Rivera is a 48 y.o. male with the following Problems and Medications.  Patient Active Problem List   Diagnosis    1st MTP arthritis    Abdominal pain    ABM (anterior basement membrane) dystrophy    ADHD    Ankle joint pain     Anxiety    Atypical chest pain    Edgar's esophagus    Bleeding hemorrhoids    Body aches    Burning mouth syndrome    Burning tongue    Chronic GERD    Corneal scar, left eye    Dermatitis    Diarrhea    Foot pain    Golfers elbow of right upper extremity    Hemorrhoid    HLD (hyperlipidemia)    Hyperopia with presbyopia of both eyes    Hypertension    Hypertriglyceridemia    Irritable bowel syndrome with diarrhea    Low back pain    Myopia, bilateral    Shoulder pain    Skin lesion    Synovitis of toe    Tinea corporis    Varicocele    Shoulder subluxation, unspecified laterality, subsequent encounter     Current Outpatient Medications   Medication Sig Dispense Refill    amLODIPine (Norvasc) 10 mg tablet TAKE 1 TABLET(10 MG) BY MOUTH EVERY DAY AS DIRECTED 90 tablet 3    hydroCHLOROthiazide (HYDRODiuril) 12.5 mg tablet Take 1 tablet (12.5 mg) by mouth once daily. 90 tablet 3    ibuprofen 800 mg tablet Take 1 tablet (800 mg) by mouth every 6 hours if needed for mild pain (1 - 3). 60 tablet 1    lisinopril 10 mg tablet TAKE 1 TABLET(10 MG) BY MOUTH EVERY DAY 90 tablet 3    tadalafil (Cialis) 5 mg tablet Take 1 tablet (5 mg) by mouth once daily. 30 tablet 11    triamcinolone (Kenalog) 0.1 % cream Apply topically 3 times a day.       No current facility-administered medications for this visit.       PMH:  Past Medical History:   Diagnosis Date    Disease of esophagus, unspecified 08/15/2013    Disorder of esophagus    Essential (primary) hypertension 05/12/2021    Hypertension    Other conditions influencing health status 03/26/2013    Attention-deficit Hyperactivity Disorder    Personal history of malignant neoplasm, unspecified     History of malignant neoplasm    Personal history of other diseases of the digestive system     History of esophageal reflux    Personal history of other diseases of the respiratory system     Personal history of asthma    Polyp of colon     Benign colonic polyp    Unspecified corneal  scar and opacity     Corneal scar, left eye       PSH:  Past Surgical History:   Procedure Laterality Date    GASTRIC FUNDOPLICATION  09/03/2014    Esophagogastric Fundoplasty Nissen Fundoplication    HERNIA REPAIR  09/03/2014    Inguinal Hernia Repair    OTHER SURGICAL HISTORY  11/16/2021    Strabismus surgery       FMH:  Family History   Problem Relation Name Age of Onset    Breast cancer Mother      Brain cancer Father      Prostate cancer Maternal Grandfather      Esophageal cancer Paternal Grandmother         SHx:  Social History     Tobacco Use    Smoking status: Former     Packs/day: 1.00     Years: 10.00     Additional pack years: 0.00     Total pack years: 10.00     Types: Cigarettes    Smokeless tobacco: Never   Substance Use Topics    Alcohol use: Yes     Alcohol/week: 21.0 standard drinks of alcohol     Types: 21 Standard drinks or equivalent per week    Drug use: Never       Allergies:  Allergies   Allergen Reactions    Penicillins Unknown     Physical Exam  Appears to have an anal fissue within a hemorrhoid, tender on exam, reproduces his symptoms    Assessment/Plan  Patient seen today for exam, he has what I believe to be an anal fissure located at the sphincter. He is seeing colorectal tomorrow to address this.    Patient is otherwise doing very well on his medication. We will follow up in 6 months via [facility] for a symptom check.    Scribe Attestation  By signing my name below, I, Bria Lilly, attest that this documentation  has been prepared under the direction and in the presence of Soy Orozco MD.

## 2024-03-22 ENCOUNTER — OFFICE VISIT (OUTPATIENT)
Dept: SURGERY | Facility: CLINIC | Age: 49
End: 2024-03-22
Payer: COMMERCIAL

## 2024-03-22 VITALS — DIASTOLIC BLOOD PRESSURE: 85 MMHG | SYSTOLIC BLOOD PRESSURE: 136 MMHG | HEART RATE: 82 BPM

## 2024-03-22 DIAGNOSIS — L90.0 LICHEN SCLEROSUS OF ANUS: Primary | ICD-10-CM

## 2024-03-22 PROCEDURE — 3079F DIAST BP 80-89 MM HG: CPT | Performed by: SURGERY

## 2024-03-22 PROCEDURE — 99214 OFFICE O/P EST MOD 30 MIN: CPT | Performed by: SURGERY

## 2024-03-22 PROCEDURE — 3075F SYST BP GE 130 - 139MM HG: CPT | Performed by: SURGERY

## 2024-03-22 PROCEDURE — 1036F TOBACCO NON-USER: CPT | Performed by: SURGERY

## 2024-03-22 ASSESSMENT — ENCOUNTER SYMPTOMS
ANAL BLEEDING: 1
APPETITE CHANGE: 0
DIARRHEA: 1
ACTIVITY CHANGE: 0

## 2024-04-10 ENCOUNTER — APPOINTMENT (OUTPATIENT)
Dept: ORTHOPEDIC SURGERY | Facility: HOSPITAL | Age: 49
End: 2024-04-10
Payer: COMMERCIAL

## 2024-04-18 ENCOUNTER — LAB (OUTPATIENT)
Dept: LAB | Facility: LAB | Age: 49
End: 2024-04-18
Payer: COMMERCIAL

## 2024-04-18 ENCOUNTER — PRE-ADMISSION TESTING (OUTPATIENT)
Dept: PREADMISSION TESTING | Facility: HOSPITAL | Age: 49
End: 2024-04-18
Payer: COMMERCIAL

## 2024-04-18 VITALS
HEIGHT: 71 IN | BODY MASS INDEX: 29.71 KG/M2 | OXYGEN SATURATION: 96 % | DIASTOLIC BLOOD PRESSURE: 81 MMHG | TEMPERATURE: 98.6 F | SYSTOLIC BLOOD PRESSURE: 135 MMHG | RESPIRATION RATE: 16 BRPM | WEIGHT: 212.19 LBS | HEART RATE: 71 BPM

## 2024-04-18 DIAGNOSIS — M25.519 SHOULDER PAIN, UNSPECIFIED CHRONICITY, UNSPECIFIED LATERALITY: ICD-10-CM

## 2024-04-18 DIAGNOSIS — Z01.818 PREOPERATIVE TESTING: Primary | ICD-10-CM

## 2024-04-18 DIAGNOSIS — Z01.818 PREOPERATIVE TESTING: ICD-10-CM

## 2024-04-18 LAB
ANION GAP SERPL CALC-SCNC: 15 MMOL/L (ref 10–20)
BASOPHILS # BLD AUTO: 0.05 X10*3/UL (ref 0–0.1)
BASOPHILS NFR BLD AUTO: 1 %
BUN SERPL-MCNC: 17 MG/DL (ref 6–23)
CALCIUM SERPL-MCNC: 9.2 MG/DL (ref 8.6–10.3)
CHLORIDE SERPL-SCNC: 99 MMOL/L (ref 98–107)
CO2 SERPL-SCNC: 26 MMOL/L (ref 21–32)
CREAT SERPL-MCNC: 0.77 MG/DL (ref 0.5–1.3)
EGFRCR SERPLBLD CKD-EPI 2021: >90 ML/MIN/1.73M*2
EOSINOPHIL # BLD AUTO: 0.28 X10*3/UL (ref 0–0.7)
EOSINOPHIL NFR BLD AUTO: 5.6 %
ERYTHROCYTE [DISTWIDTH] IN BLOOD BY AUTOMATED COUNT: 11.1 % (ref 11.5–14.5)
GLUCOSE SERPL-MCNC: 99 MG/DL (ref 74–99)
HCT VFR BLD AUTO: 42.9 % (ref 41–52)
HGB BLD-MCNC: 14.5 G/DL (ref 13.5–17.5)
IMM GRANULOCYTES # BLD AUTO: 0.07 X10*3/UL (ref 0–0.7)
IMM GRANULOCYTES NFR BLD AUTO: 1.4 % (ref 0–0.9)
LYMPHOCYTES # BLD AUTO: 0.77 X10*3/UL (ref 1.2–4.8)
LYMPHOCYTES NFR BLD AUTO: 15.4 %
MCH RBC QN AUTO: 30.7 PG (ref 26–34)
MCHC RBC AUTO-ENTMCNC: 33.8 G/DL (ref 32–36)
MCV RBC AUTO: 91 FL (ref 80–100)
MONOCYTES # BLD AUTO: 0.48 X10*3/UL (ref 0.1–1)
MONOCYTES NFR BLD AUTO: 9.6 %
NEUTROPHILS # BLD AUTO: 3.35 X10*3/UL (ref 1.2–7.7)
NEUTROPHILS NFR BLD AUTO: 67 %
NRBC BLD-RTO: ABNORMAL /100{WBCS}
PLATELET # BLD AUTO: 174 X10*3/UL (ref 150–450)
POTASSIUM SERPL-SCNC: 4.1 MMOL/L (ref 3.5–5.3)
RBC # BLD AUTO: 4.73 X10*6/UL (ref 4.5–5.9)
SODIUM SERPL-SCNC: 136 MMOL/L (ref 136–145)
WBC # BLD AUTO: 5 X10*3/UL (ref 4.4–11.3)

## 2024-04-18 PROCEDURE — 80048 BASIC METABOLIC PNL TOTAL CA: CPT

## 2024-04-18 PROCEDURE — 85025 COMPLETE CBC W/AUTO DIFF WBC: CPT

## 2024-04-18 PROCEDURE — 99214 OFFICE O/P EST MOD 30 MIN: CPT | Performed by: NURSE PRACTITIONER

## 2024-04-18 PROCEDURE — 36415 COLL VENOUS BLD VENIPUNCTURE: CPT

## 2024-04-18 ASSESSMENT — ENCOUNTER SYMPTOMS
CONSTITUTIONAL NEGATIVE: 1
EYES NEGATIVE: 1
ROS GI COMMENTS: GERD
HEMATOLOGIC/LYMPHATIC NEGATIVE: 1
NEUROLOGICAL NEGATIVE: 1
ALLERGIC/IMMUNOLOGIC NEGATIVE: 1

## 2024-04-18 ASSESSMENT — PAIN - FUNCTIONAL ASSESSMENT: PAIN_FUNCTIONAL_ASSESSMENT: 0-10

## 2024-04-18 ASSESSMENT — PAIN SCALES - GENERAL: PAINLEVEL_OUTOF10: 0 - NO PAIN

## 2024-04-18 NOTE — CPM/PAT H&P
CPM/PAT Evaluation     Shant Rivera is a 48 y.o. male   Chief Complaint: hip pain    HPI:  Patient is a 49 y/o alert and oriented male coming in for PAT for a scheduled Total Hip Arthroplasty on 5/2/24 with Dr. Burgos.    The patient reports he has an intermittent popping in his right shoulder causing sharp pain that lasts a few seconds.  He states laying on his elbow can exacerbate the pain.  He states he has no numbness, tingling or weakness in his right arm.  No decreased ROM.  He has not had any recent injections or physical therapy.  Patient denies chest pain, SOB, COLLIER and NVDC.    Patient also denies Hx: DVT/PE.    Current medications were reviewed and a presurgical mediation schedule was provided.    He has no questions at this time.   Past Medical History:   Diagnosis Date    Disease of esophagus, unspecified 08/15/2013    Disorder of esophagus    Essential (primary) hypertension 05/12/2021    Hypertension    Other conditions influencing health status 03/26/2013    Attention-deficit Hyperactivity Disorder    Personal history of malignant neoplasm, unspecified     History of malignant neoplasm    Personal history of other diseases of the digestive system     History of esophageal reflux    Personal history of other diseases of the respiratory system     Personal history of asthma    Polyp of colon     Benign colonic polyp    Unspecified corneal scar and opacity     Corneal scar, left eye      Past Surgical History:   Procedure Laterality Date    GASTRIC FUNDOPLICATION  09/03/2014    Esophagogastric Fundoplasty Nissen Fundoplication    HERNIA REPAIR  09/03/2014    Inguinal Hernia Repair    OTHER SURGICAL HISTORY  11/16/2021    Strabismus surgery        Allergies   Allergen Reactions    Penicillins Unknown        Current Outpatient Medications on File Prior to Visit   Medication Sig Dispense Refill    amLODIPine (Norvasc) 10 mg tablet TAKE 1 TABLET(10 MG) BY MOUTH EVERY DAY AS DIRECTED 90 tablet 3     hydroCHLOROthiazide (HYDRODiuril) 12.5 mg tablet Take 1 tablet (12.5 mg) by mouth once daily. 90 tablet 3    ibuprofen 800 mg tablet Take 1 tablet (800 mg) by mouth every 6 hours if needed for mild pain (1 - 3). 60 tablet 1    lisinopril 10 mg tablet TAKE 1 TABLET(10 MG) BY MOUTH EVERY DAY 90 tablet 3    tadalafil (Cialis) 5 mg tablet Take 1 tablet (5 mg) by mouth once daily. 30 tablet 11    triamcinolone (Kenalog) 0.1 % cream Apply topically 3 times a day.       No current facility-administered medications on file prior to visit.       Review of Systems   Constitutional: Negative.    HENT: Negative.     Eyes: Negative.    Respiratory:          Hx Asthma - stable no flares   Cardiovascular:         Hypertension - stable   Gastrointestinal:         GERD   Genitourinary:         BPH w/ LUTS   Musculoskeletal:         Gout  Shoulder subluxation   Skin: Negative.    Allergic/Immunologic: Negative.    Neurological: Negative.    Hematological: Negative.    Psychiatric/Behavioral:          ADHD   stable      Physical Exam  Vitals and nursing note reviewed.   Constitutional:       Appearance: Normal appearance.   HENT:      Head: Normocephalic and atraumatic.      Mouth/Throat:      Mouth: Mucous membranes are moist.      Pharynx: Oropharynx is clear.   Eyes:      Pupils: Pupils are equal, round, and reactive to light.   Cardiovascular:      Rate and Rhythm: Normal rate and regular rhythm.      Heart sounds: Normal heart sounds.   Pulmonary:      Effort: Pulmonary effort is normal.      Breath sounds: Normal breath sounds.   Abdominal:      General: Bowel sounds are normal.      Palpations: Abdomen is soft.   Musculoskeletal:         General: Normal range of motion.      Cervical back: Normal range of motion.   Skin:     General: Skin is warm and dry.   Neurological:      General: No focal deficit present.      Mental Status: He is alert and oriented to person, place, and time.   Psychiatric:         Mood and Affect: Mood  normal.         Behavior: Behavior normal.         Thought Content: Thought content normal.         Judgment: Judgment normal.        PAT AIRWAY:   Airway:     Mallampati::  III    TM distance::  >3 FB    Neck ROM::  Full    Assessment and Plan:     Neuro:   No neurologic diagnoses,.  Preoperative brain exercise educational handout provided to patient.    HEENT/Airway:  add normal or abnormal HEENT.  Add normal or abnormal airway    Cardiovascular:  No additional preoperative testing is currently indicated.    ASA II  EKG no today  METS are  RCRI  0 which is 3.9% % 30 day risk of MACE (risk for cardiac death, nonfatal myocardial infarction, and nonfactal cardiac arrest  MIGUELINA score which indicates a   % risk of intraoperative or 30-day postoperative MACE    Hypertension stable   managed with amlodipine 10mg daily  Managed with hydrochlorothiazide 12.5mg daily  Managed with lisinopril 10mg daily      Pulmonary:   Preoperative deep breathing educational handout provided to patient.    ARISCAT:    3  points which is a low (1.6%) risk of in-hospital post-op pulmonary complications   PRODIGY:    points which is a low risk of post op opioid induced respiratory depression episodes  STOP BANG:    3 points which is a low risk for moderate to severe AGNES    Hx: Asthma stable  On no medications or inhalers    Renal:   BPH w/ LUTS   Managed with tadalafil 5mg daily    Pt at Low risk for perioperative TRACIE based on Dynamic Predictive Scoring Tool for Perioperative TRACIE  BMP ordered    Endocrine: .   none    Hematologic:  Preoperative DVT educational handout provided to patient.    Caprini Score:    points which is a low risk of perioperative VTE    Gastrointestinal:   No diagnosis or significant findings on chart review or clinical presentation and evaluation.    GERD- stable no recent flares    Apfel: 0 points 10%% risk for post operative N/V    Infectious disease:  none    Musculoskeletal:       Shoulder Subluxation  Right  Shoulder Scope Debridement and Open Biceps Tenodesis  Gout - no recent flares    Other:     NSQUIP    overall surgical risk  Modified Frailty  JHFRAT-  3 points low falls risk    Anesthesia:  No anesthesia complications    no dental issues  ex-smoker quit 10 yrs ago was 1/2-1 PPD x 15 yrs  2-3 daily drinks  Vapes THC  No personal/family issues with Anesthesia  No nickel, shell fish, or iodine allergies    Discussed with patient medication instructions, NPO guidelines, and any questions or concerns. Patient does not need further workup prior to preceding with elective surgery based on based on risk assessment.     Face to Face patient contact time 30    ELMO Tello-CNP 4/18/2024 7:29 AM    Labs ordered  cbc and basic

## 2024-04-18 NOTE — PREPROCEDURE INSTRUCTIONS
Medication List            Accurate as of April 18, 2024  8:13 AM. Always use your most recent med list.                amLODIPine 10 mg tablet  Commonly known as: Norvasc  TAKE 1 TABLET(10 MG) BY MOUTH EVERY DAY AS DIRECTED  Medication Adjustments for Surgery: Take morning of surgery with sip of water, no other fluids     hydroCHLOROthiazide 12.5 mg tablet  Commonly known as: Microzide  Take 1 tablet (12.5 mg) by mouth once daily.  Medication Adjustments for Surgery: Take morning of surgery with sip of water, no other fluids     ibuprofen 800 mg tablet  Take 1 tablet (800 mg) by mouth every 6 hours if needed for mild pain (1 - 3).  Medication Adjustments for Surgery: Stop 7 days before surgery  Notes to patient: Stop on 4/19/2024     lisinopril 10 mg tablet  TAKE 1 TABLET(10 MG) BY MOUTH EVERY DAY  Medication Adjustments for Surgery: Stop 1 day before surgery  Notes to patient: Last dose on 4/24/2024     psyllium 3.4 gram packet  Commonly known as: Metamucil  Medication Adjustments for Surgery: Continue until night before surgery     tadalafil 5 mg tablet  Commonly known as: Cialis  Take 1 tablet (5 mg) by mouth once daily.  Medication Adjustments for Surgery: Stop 3 days before surgery  Notes to patient: Last dose on 4/22/2024              NPO Instructions:    Do not eat any food after midnight the night before your surgery/procedure.  You may have clear liquids until TWO hours before surgery/procedure. This includes water, black tea/coffee, (no milk or cream) apple juice and electrolyte drinks (Gatorade).  You may chew gum up to TWO hours before your surgery/procedure.    Additional Instructions:     Seven/Six Days before Surgery:  Review your medication instructions, stop indicated medications  Five Days before Surgery:  Review your medication instructions, stop indicated medications  Three Days before Surgery:  Review your medication instructions, stop indicated medications  The Day before Surgery:  Review  your medication instructions, stop indicated medications  You will be contacted regarding the time of your arrival to facility and surgery time  Do not eat any food after Midnight  Day of Surgery:  Review your medication instructions, take indicated medications  You may have clear liquids until TWO hours before surgery/procedure.  This includes water, black tea/coffee, (no milk or cream) apple juice and electrolyte drinks (Gatorade)  You may chew gum up to TWO hours before your surgery/procedure  Wear  comfortable loose fitting clothing  Do not use moisturizers, creams, lotions or perfume  All jewelry and valuables should be left at home    CONTACT SURGEON'S OFFICE IF YOU DEVELOP:  * Fever = 100.4 F   * New respiratory symptoms (e.g. cough, shortness of breath, respiratory distress, sore throat)  * Recent loss of taste or smell  *Flu like symptoms such as headache, fatigue or gastrointestinal symptoms  * You develop any open sores, shingles, burning or painful urination   AND/OR:  * You no longer wish to have the surgery.  * Any other personal circumstances change that may lead to the need to cancel or defer this surgery.  *You were admitted to any hospital within one week of your planned procedure.    SMOKING:  *Quitting smoking can make a huge difference to your health and recovery from surgery.    *If you need help with quitting, call 3-297-QUIT-NOW.    THE DAY BEFORE SURGERY:  *Do not eat any food after midnight the night before your surgery.   *You may have up to TEN OUNCES of clear liquids until TWO hours before your instructed ARRIVAL TIME to hospital. This includes water, black tea/coffee, (no milk or cream) apple juice, clear broth and electrolyte drinks (Gatorade). Please avoid clear liquids that are red in color.   *You may chew gum/mints up to TWO hours before your surgery/procedure.    SURGICAL TIME:  *You will be contacted between 2 p.m. and 3 p.m. the business day before your surgery with your arrival  time.  *If you haven't received a call by 3pm, call [phone]  *Scheduled surgery times may change and you will be notified if this occurs-check your personal voicemail for any updates.    ON THE MORNING OF SURGERY:  *Wear comfortable, loose fitting clothing.   *Do not use moisturizers, creams, lotions or perfume.  *All jewelry and valuables should be left at home.  *Prosthetic devices such as contact lenses, hearing aids, dentures, eyelash extensions, hairpins and body piercing must be removed before surgery.    BRING WITH YOU:  *Photo ID and insurance card  *Current list of medications and allergies  *Pacemaker/Defibrillator/Heart stent cards  *CPAP machine and mask  *Slings/splints/crutches  *Copy of your complete Advanced Directive/DHPOA-if applicable  *Neurostimulator implant remote    PARKING AND ARRIVAL:  *Check in at the Main Entrance desk and let them know you are here for surgery.    IF YOU ARE HAVING OUTPATIENT/SAME DAY SURGERY:  *A responsible adult MUST accompany you at the time of discharge and stay with you for 24 hours after your surgery.  *You may NOT drive yourself home after surgery.  *You may use a taxi or ride sharing service ([employer], [employer]) to return home ONLY if you are accompanied by a friend or family member.  *Instructions for resuming your medications will be provided by your surgeon.    Thank you for coming to Pre Admission testing.     If I have prescribe medication please don't forget to  at your pharmacy.     Any questions about today's visit call [phone] and leave a message in the general mailbox.    Patient instructed to ambulate as soon as possible postoperatively to decrease thromboembolic risk.    Lydia Miramontes, APRN-CNP    Thank you for visiting [facility].  If you have any changes to your health condition, please call the surgeons office to alert them and give them details of your symptoms.    Department of Anesthesiology and Perioperative  Medicine      Preoperative Fasting Guidelines    Why must I stop eating and drinking near surgery time?  With sedation, food or liquid in your stomach can enter your lungs causing serious complications  Increases nausea and vomiting    When do I need to stop eating and drinking before my surgery?  Do not eat any food after midnight the night before your surgery/procedure.  You may have up to TEN ounces of clear liquid until TWO hours before your instructed arrival time to the hospital.  This includes water, black tea/coffee, (no milk or cream) apple juice, and electrolyte drinks (Gatorade)  You may chew gum until TWO hours before your surgery/procedure      Additional Instructions:     The Day before Surgery:  -Review your medication instructions, stop indicated medications  -You will be contacted in the evening regarding the time of your arrival to facility and surgery time    Day of Surgery:  -Review your medication instructions, take indicated medications  -Wear comfortable loose fitting clothing  -Do not use moisturizers, creams, lotions or perfume  -All jewelry and valuables should be left at home              Preoperative Brain Exercises    What are brain exercises?  A brain exercise is any activity that engages your thinking (cognitive) skills.    What types of activities are considered brain exercises?  Jigsaw puzzles, crossword puzzles, word jumble, memory games, word search, and many more.  Many can be found free online or on your phone via a mobile santi.    Why should I do brain exercises before my surgery?  More recent research has shown brain exercise before surgery can lower the risk of postoperative delirium (confusion) which can be especially important for older adults.  Patients who did brain exercises for 5 to 10 hours the days before surgery, cut their risk of postoperative delirium in half up to 1 week after surgery.                  [facility]    Preoperative Deep  Breathing Exercises    Why it is important to do deep breathing exercises before my surgery?  Deep breathing exercises strengthen your breathing muscles.  This helps you to recover after your surgery and decreases the chance of breathing complications.      How are the deep breathing exercises done?  Sit straight with your back supported.  Breathe in deeply and slowly through your nose. Your lower rib cage should expand and your abdomen may move forward.  Hold that breath for 3 to 5 seconds.  Breathe out through pursed lips, slowly and completely.  Rest and repeat 10 times every hour while awake.  Rest longer if you become dizzy or lightheaded.                [facility]    Preoperative Deep Breathing Exercises    Why it is important to do deep breathing exercises before my surgery?  Deep breathing exercises strengthen your breathing muscles.  This helps you to recover after your surgery and decreases the chance of breathing complications.      How are the deep breathing exercises done?  Sit straight with your back supported.  Breathe in deeply and slowly through your nose. Your lower rib cage should expand and your abdomen may move forward.  Hold that breath for 3 to 5 seconds.  Breathe out through pursed lips, slowly and completely.  Rest and repeat 10 times every hour while awake.  Rest longer if you become dizzy or lightheaded.      Patient Information: Incentive Spirometer  What is an incentive spirometer?  An incentive spirometer is a device used before and after surgery to “exercise” your lungs.  It helps you to take deeper breaths to expand your lungs.  Below is an example of a basic incentive spirometer.  The device you receive may differ slightly but they all function the same.    Why do I need to use an incentive spirometer?  Using your incentive spirometer prepares your lungs for surgery and helps prevent lung problems after surgery.  How do I use my incentive spirometer?  When  you're using your incentive spirometer, make sure to breathe through your mouth. If you breathe through your nose, the incentive spirometer won't work properly. You can hold your nose if you have trouble.  If you feel dizzy at any time, stop and rest. Try again at a later time.  Follow the steps below:  Set up your incentive spirometer, expand the flexible tubing and connect to the outlet.  Sit upright in a chair or bed. Hold the incentive spirometer at eye level.   Put the mouthpiece in your mouth and close your lips tightly around it. Slowly breathe out (exhale) completely.  Breathe in (inhale) slowly through your mouth as deeply as you can. As you take a breath, you will see the piston rise inside the large column. While the piston rises, the indicator should move upwards. It should stay in between the 2 arrows (see Figure).  Try to get the piston as high as you can, while keeping the indicator between the arrows.   If the indicator doesn't stay between the arrows, you're breathing either too fast or too slow.  When you get it as high as you can, hold your breath for 10 seconds, or as long as possible. While you're holding your breath, the piston will slowly fall to the base of the spirometer.  Once the piston reaches the bottom of the spirometer, breathe out slowly through your mouth. Rest for a few seconds.  Repeat 10 times. Try to get the piston to the same level with each breath.  Repeat every hour while awake  You can carefully clean the outside of the mouthpiece with an alcohol wipe or soap and water.      Patient and Family Education             Ways You Can Help Prevent Blood Clots             This handout explains some simple things you can do to help prevent blood clots.      Blood clots are blockages that can form in the body's veins. When a blood clot forms in your deep veins, it may be called a deep vein thrombosis, or DVT for short. Blood clots can happen in any part of the body where blood flows,  but they are most common in the arms and legs. If a piece of a blood clot breaks free and travels to the lungs, it is called a pulmonary embolus (PE). A PE can be a very serious problem.         Being in the hospital or having surgery can raise your chances of getting a blood clot because you may not be well enough to move around as much as you normally do.         Ways you can help prevent blood clots in the hospital         Wearing SCDs. SCDs stands for Sequential Compression Devices.   SCDs are special sleeves that wrap around your legs  They attach to a pump that fills them with air to gently squeeze your legs every few minutes.   This helps return the blood in your legs to your heart.   SCDs should only be taken off when walking or bathing.   SCDs may not be comfortable, but they can help save your life.               Wearing compression stockings - if your doctor orders them. These special snug fitting stockings gently squeeze your legs to help blood flow.       Walking. Walking helps move the blood in your legs.   If your doctor says it is ok, try walking the halls at least   5 times a day. Ask us to help you get up, so you don't fall.      Taking any blood thinning medicines your doctor orders.        Page 1 of 2     [facility]; 3/23   Ways you can help prevent blood clots at home       Wearing compression stockings - if your doctor orders them. ? Walking - to help move the blood in your legs.       Taking any blood thinning medicines your doctor orders.      Signs of a blood clot or PE      Tell your doctor or nurse know right away if you have of the problems listed below.    If you are at home, seek medical care right away. Call 911 for chest pain or problems breathing.               Signs of a blood clot (DVT) - such as pain,  swelling, redness or warmth in your arm or leg      Signs of a pulmonary embolism (PE) - such as chest     pain or feeling short of breath

## 2024-04-25 ENCOUNTER — ANESTHESIA EVENT (OUTPATIENT)
Dept: OPERATING ROOM | Facility: HOSPITAL | Age: 49
End: 2024-04-25
Payer: COMMERCIAL

## 2024-04-25 ENCOUNTER — ANESTHESIA (OUTPATIENT)
Dept: OPERATING ROOM | Facility: HOSPITAL | Age: 49
End: 2024-04-25
Payer: COMMERCIAL

## 2024-04-25 ENCOUNTER — HOSPITAL ENCOUNTER (OUTPATIENT)
Facility: HOSPITAL | Age: 49
Setting detail: OUTPATIENT SURGERY
Discharge: HOME | End: 2024-04-25
Attending: ORTHOPAEDIC SURGERY | Admitting: ORTHOPAEDIC SURGERY
Payer: COMMERCIAL

## 2024-04-25 VITALS
HEART RATE: 72 BPM | RESPIRATION RATE: 20 BRPM | DIASTOLIC BLOOD PRESSURE: 85 MMHG | WEIGHT: 212.52 LBS | OXYGEN SATURATION: 95 % | SYSTOLIC BLOOD PRESSURE: 138 MMHG | BODY MASS INDEX: 29.75 KG/M2 | HEIGHT: 71 IN | TEMPERATURE: 98.6 F

## 2024-04-25 DIAGNOSIS — S43.003D: Primary | ICD-10-CM

## 2024-04-25 PROCEDURE — A29823 PR SHLDR ARTHROSCOP,EXTEN DEBRIDE: Performed by: ANESTHESIOLOGIST ASSISTANT

## 2024-04-25 PROCEDURE — 64415 NJX AA&/STRD BRCH PLXS IMG: CPT | Performed by: STUDENT IN AN ORGANIZED HEALTH CARE EDUCATION/TRAINING PROGRAM

## 2024-04-25 PROCEDURE — 7100000002 HC RECOVERY ROOM TIME - EACH INCREMENTAL 1 MINUTE: Performed by: ORTHOPAEDIC SURGERY

## 2024-04-25 PROCEDURE — 2780000003 HC OR 278 NO HCPCS: Performed by: ORTHOPAEDIC SURGERY

## 2024-04-25 PROCEDURE — 2720000007 HC OR 272 NO HCPCS: Performed by: ORTHOPAEDIC SURGERY

## 2024-04-25 PROCEDURE — 2500000004 HC RX 250 GENERAL PHARMACY W/ HCPCS (ALT 636 FOR OP/ED): Mod: JZ | Performed by: ORTHOPAEDIC SURGERY

## 2024-04-25 PROCEDURE — 3700000001 HC GENERAL ANESTHESIA TIME - INITIAL BASE CHARGE: Performed by: ORTHOPAEDIC SURGERY

## 2024-04-25 PROCEDURE — 7100000001 HC RECOVERY ROOM TIME - INITIAL BASE CHARGE: Performed by: ORTHOPAEDIC SURGERY

## 2024-04-25 PROCEDURE — 3700000002 HC GENERAL ANESTHESIA TIME - EACH INCREMENTAL 1 MINUTE: Performed by: ORTHOPAEDIC SURGERY

## 2024-04-25 PROCEDURE — A29823 PR SHLDR ARTHROSCOP,EXTEN DEBRIDE: Performed by: STUDENT IN AN ORGANIZED HEALTH CARE EDUCATION/TRAINING PROGRAM

## 2024-04-25 PROCEDURE — 7100000010 HC PHASE TWO TIME - EACH INCREMENTAL 1 MINUTE: Performed by: ORTHOPAEDIC SURGERY

## 2024-04-25 PROCEDURE — 2500000004 HC RX 250 GENERAL PHARMACY W/ HCPCS (ALT 636 FOR OP/ED): Performed by: ANESTHESIOLOGIST ASSISTANT

## 2024-04-25 PROCEDURE — C1713 ANCHOR/SCREW BN/BN,TIS/BN: HCPCS | Performed by: ORTHOPAEDIC SURGERY

## 2024-04-25 PROCEDURE — A4649 SURGICAL SUPPLIES: HCPCS | Performed by: ORTHOPAEDIC SURGERY

## 2024-04-25 PROCEDURE — 23430 REPAIR BICEPS TENDON: CPT | Performed by: ORTHOPAEDIC SURGERY

## 2024-04-25 PROCEDURE — 2500000005 HC RX 250 GENERAL PHARMACY W/O HCPCS: Performed by: ANESTHESIOLOGIST ASSISTANT

## 2024-04-25 PROCEDURE — 3600000009 HC OR TIME - EACH INCREMENTAL 1 MINUTE - PROCEDURE LEVEL FOUR: Performed by: ORTHOPAEDIC SURGERY

## 2024-04-25 PROCEDURE — 2500000004 HC RX 250 GENERAL PHARMACY W/ HCPCS (ALT 636 FOR OP/ED): Performed by: ORTHOPAEDIC SURGERY

## 2024-04-25 PROCEDURE — 29822 SHO ARTHRS SRG LMTD DBRDMT: CPT | Performed by: ORTHOPAEDIC SURGERY

## 2024-04-25 PROCEDURE — 3600000004 HC OR TIME - INITIAL BASE CHARGE - PROCEDURE LEVEL FOUR: Performed by: ORTHOPAEDIC SURGERY

## 2024-04-25 PROCEDURE — 2500000004 HC RX 250 GENERAL PHARMACY W/ HCPCS (ALT 636 FOR OP/ED): Performed by: STUDENT IN AN ORGANIZED HEALTH CARE EDUCATION/TRAINING PROGRAM

## 2024-04-25 PROCEDURE — 7100000009 HC PHASE TWO TIME - INITIAL BASE CHARGE: Performed by: ORTHOPAEDIC SURGERY

## 2024-04-25 DEVICE — KIT, TENOLOK TENODESIS, 6.0MM W/1 P2 SUTURE, 2.4GUIDEPIN, 7.5DRILL BIT: Type: IMPLANTABLE DEVICE | Site: SHOULDER | Status: FUNCTIONAL

## 2024-04-25 RX ORDER — FENTANYL CITRATE 50 UG/ML
50 INJECTION, SOLUTION INTRAMUSCULAR; INTRAVENOUS EVERY 5 MIN PRN
Status: DISCONTINUED | OUTPATIENT
Start: 2024-04-25 | End: 2024-04-25 | Stop reason: HOSPADM

## 2024-04-25 RX ORDER — MEPERIDINE HYDROCHLORIDE 25 MG/ML
12.5 INJECTION INTRAMUSCULAR; INTRAVENOUS; SUBCUTANEOUS EVERY 10 MIN PRN
Status: DISCONTINUED | OUTPATIENT
Start: 2024-04-25 | End: 2024-04-25 | Stop reason: HOSPADM

## 2024-04-25 RX ORDER — CLINDAMYCIN PHOSPHATE 900 MG/50ML
900 INJECTION, SOLUTION INTRAVENOUS ONCE
Status: COMPLETED | OUTPATIENT
Start: 2024-04-25 | End: 2024-04-25

## 2024-04-25 RX ORDER — FENTANYL CITRATE 50 UG/ML
50 INJECTION, SOLUTION INTRAMUSCULAR; INTRAVENOUS ONCE
Status: COMPLETED | OUTPATIENT
Start: 2024-04-25 | End: 2024-04-25

## 2024-04-25 RX ORDER — FENTANYL CITRATE 50 UG/ML
INJECTION, SOLUTION INTRAMUSCULAR; INTRAVENOUS AS NEEDED
Status: DISCONTINUED | OUTPATIENT
Start: 2024-04-25 | End: 2024-04-25

## 2024-04-25 RX ORDER — IPRATROPIUM BROMIDE 0.5 MG/2.5ML
500 SOLUTION RESPIRATORY (INHALATION) EVERY 30 MIN PRN
Status: DISCONTINUED | OUTPATIENT
Start: 2024-04-25 | End: 2024-04-25 | Stop reason: HOSPADM

## 2024-04-25 RX ORDER — ROCURONIUM BROMIDE 10 MG/ML
INJECTION, SOLUTION INTRAVENOUS AS NEEDED
Status: DISCONTINUED | OUTPATIENT
Start: 2024-04-25 | End: 2024-04-25

## 2024-04-25 RX ORDER — SODIUM CHLORIDE, SODIUM LACTATE, POTASSIUM CHLORIDE, CALCIUM CHLORIDE 600; 310; 30; 20 MG/100ML; MG/100ML; MG/100ML; MG/100ML
40 INJECTION, SOLUTION INTRAVENOUS CONTINUOUS
Status: DISCONTINUED | OUTPATIENT
Start: 2024-04-25 | End: 2024-04-25 | Stop reason: HOSPADM

## 2024-04-25 RX ORDER — ONDANSETRON HYDROCHLORIDE 2 MG/ML
4 INJECTION, SOLUTION INTRAVENOUS ONCE AS NEEDED
Status: DISCONTINUED | OUTPATIENT
Start: 2024-04-25 | End: 2024-04-25 | Stop reason: HOSPADM

## 2024-04-25 RX ORDER — OXYCODONE AND ACETAMINOPHEN 5; 325 MG/1; MG/1
1 TABLET ORAL EVERY 4 HOURS PRN
Qty: 42 TABLET | Refills: 0 | Status: SHIPPED | OUTPATIENT
Start: 2024-04-25 | End: 2024-05-02

## 2024-04-25 RX ORDER — SODIUM CHLORIDE, SODIUM LACTATE, POTASSIUM CHLORIDE, CALCIUM CHLORIDE 600; 310; 30; 20 MG/100ML; MG/100ML; MG/100ML; MG/100ML
100 INJECTION, SOLUTION INTRAVENOUS CONTINUOUS
Status: DISCONTINUED | OUTPATIENT
Start: 2024-04-25 | End: 2024-04-25 | Stop reason: HOSPADM

## 2024-04-25 RX ORDER — PROPOFOL 10 MG/ML
INJECTION, EMULSION INTRAVENOUS AS NEEDED
Status: DISCONTINUED | OUTPATIENT
Start: 2024-04-25 | End: 2024-04-25

## 2024-04-25 RX ORDER — MIDAZOLAM HYDROCHLORIDE 1 MG/ML
2 INJECTION, SOLUTION INTRAMUSCULAR; INTRAVENOUS ONCE
Status: COMPLETED | OUTPATIENT
Start: 2024-04-25 | End: 2024-04-25

## 2024-04-25 RX ORDER — ALBUTEROL SULFATE 0.83 MG/ML
2.5 SOLUTION RESPIRATORY (INHALATION) EVERY 30 MIN PRN
Status: DISCONTINUED | OUTPATIENT
Start: 2024-04-25 | End: 2024-04-25 | Stop reason: HOSPADM

## 2024-04-25 RX ORDER — LABETALOL HYDROCHLORIDE 5 MG/ML
5 INJECTION, SOLUTION INTRAVENOUS EVERY 5 MIN PRN
Status: DISCONTINUED | OUTPATIENT
Start: 2024-04-25 | End: 2024-04-25 | Stop reason: HOSPADM

## 2024-04-25 RX ORDER — ONDANSETRON HYDROCHLORIDE 2 MG/ML
INJECTION, SOLUTION INTRAVENOUS AS NEEDED
Status: DISCONTINUED | OUTPATIENT
Start: 2024-04-25 | End: 2024-04-25

## 2024-04-25 RX ORDER — LIDOCAINE HYDROCHLORIDE 10 MG/ML
INJECTION, SOLUTION EPIDURAL; INFILTRATION; INTRACAUDAL; PERINEURAL AS NEEDED
Status: DISCONTINUED | OUTPATIENT
Start: 2024-04-25 | End: 2024-04-25

## 2024-04-25 RX ADMIN — MIDAZOLAM HYDROCHLORIDE 2 MG: 1 INJECTION, SOLUTION INTRAMUSCULAR; INTRAVENOUS at 06:56

## 2024-04-25 RX ADMIN — FENTANYL CITRATE 50 MCG: 50 INJECTION INTRAMUSCULAR; INTRAVENOUS at 07:31

## 2024-04-25 RX ADMIN — CLINDAMYCIN IN 5 PERCENT DEXTROSE 900 MG: 18 INJECTION, SOLUTION INTRAVENOUS at 07:15

## 2024-04-25 RX ADMIN — SUGAMMADEX 200 MG: 100 INJECTION, SOLUTION INTRAVENOUS at 07:52

## 2024-04-25 RX ADMIN — ONDANSETRON 4 MG: 2 INJECTION INTRAMUSCULAR; INTRAVENOUS at 07:15

## 2024-04-25 RX ADMIN — ROCURONIUM BROMIDE 50 MG: 10 INJECTION INTRAVENOUS at 07:10

## 2024-04-25 RX ADMIN — LIDOCAINE HYDROCHLORIDE 5 ML: 10 INJECTION, SOLUTION EPIDURAL; INFILTRATION; INTRACAUDAL; PERINEURAL at 07:10

## 2024-04-25 RX ADMIN — PROPOFOL 200 MG: 10 INJECTION, EMULSION INTRAVENOUS at 07:10

## 2024-04-25 RX ADMIN — DEXAMETHASONE SODIUM PHOSPHATE 8 MG: 4 INJECTION, SOLUTION INTRAMUSCULAR; INTRAVENOUS at 07:15

## 2024-04-25 RX ADMIN — FENTANYL CITRATE 50 MCG: 50 INJECTION INTRAMUSCULAR; INTRAVENOUS at 07:19

## 2024-04-25 RX ADMIN — FENTANYL CITRATE 50 MCG: 50 INJECTION INTRAMUSCULAR; INTRAVENOUS at 06:56

## 2024-04-25 RX ADMIN — SODIUM CHLORIDE, SODIUM LACTATE, POTASSIUM CHLORIDE, AND CALCIUM CHLORIDE 100 ML/HR: 600; 310; 30; 20 INJECTION, SOLUTION INTRAVENOUS at 06:17

## 2024-04-25 SDOH — HEALTH STABILITY: MENTAL HEALTH: CURRENT SMOKER: 0

## 2024-04-25 ASSESSMENT — PAIN - FUNCTIONAL ASSESSMENT
PAIN_FUNCTIONAL_ASSESSMENT: 0-10

## 2024-04-25 ASSESSMENT — PAIN SCALES - GENERAL
PAINLEVEL_OUTOF10: 0 - NO PAIN

## 2024-04-25 ASSESSMENT — COLUMBIA-SUICIDE SEVERITY RATING SCALE - C-SSRS
6. HAVE YOU EVER DONE ANYTHING, STARTED TO DO ANYTHING, OR PREPARED TO DO ANYTHING TO END YOUR LIFE?: NO
2. HAVE YOU ACTUALLY HAD ANY THOUGHTS OF KILLING YOURSELF?: NO
1. IN THE PAST MONTH, HAVE YOU WISHED YOU WERE DEAD OR WISHED YOU COULD GO TO SLEEP AND NOT WAKE UP?: NO

## 2024-04-25 NOTE — ANESTHESIA PREPROCEDURE EVALUATION
Patient: Shant Rivera    Procedure Information       Date/Time: 04/25/24 0700    Procedures:       RIGHT SHOULDER SCOPE DEBRIDMENT AND OPEN BICEPS TENODESIS (Right: Shoulder)      RIGHT SHOULDER SCOPE DEBRIDMENT AND OPEN BICEPS TENODESIS (LINVATEC) (Right: Shoulder)    Location: [facility] / [facility]    Surgeons: Denis Acharya MD          Past Medical History:   Diagnosis Date    ADHD (attention deficit hyperactivity disorder)     Anxiety     BPH (benign prostatic hyperplasia)     Disease of esophagus, unspecified 08/15/2013    Disorder of esophagus    Essential (primary) hypertension 05/12/2021    Hypertension    GERD (gastroesophageal reflux disease)     Other conditions influencing health status 03/26/2013    Attention-deficit Hyperactivity Disorder    Personal history of malignant neoplasm, unspecified     History of malignant neoplasm    Personal history of other diseases of the digestive system     History of esophageal reflux    Personal history of other diseases of the respiratory system     Personal history of asthma    Polyp of colon     Benign colonic polyp    Unspecified corneal scar and opacity     Corneal scar, left eye     Past Surgical History:   Procedure Laterality Date    GASTRIC FUNDOPLICATION  09/03/2014    Esophagogastric Fundoplasty Nissen Fundoplication    HERNIA REPAIR  09/03/2014    Inguinal Hernia Repair    OTHER SURGICAL HISTORY  11/16/2021    Strabismus surgery       Relevant Problems   Cardiac   (+) Atypical chest pain   (+) HLD (hyperlipidemia)   (+) Hypertension   (+) Hypertriglyceridemia      Neuro   (+) Anxiety      GI   (+) Bleeding hemorrhoids   (+) Chronic GERD   (+) Irritable bowel syndrome with diarrhea      ID   (+) Tinea corporis       Clinical information reviewed:    Allergies  Meds               NPO Detail:  NPO/Void Status  Date of Last Liquid: 04/24/24  Time of Last Liquid: 0000  Date of Last Solid: 04/25/24  Time of Last Solid: 1830  Time of Last Void:  0531         Physical Exam    Airway  Mallampati: II  TM distance: >3 FB  Neck ROM: full     Cardiovascular    Dental    Pulmonary    Abdominal            Anesthesia Plan    History of general anesthesia?: yes  History of complications of general anesthesia?: no    ASA 2     general and regional     The patient is not a current smoker.  Patient was not previously instructed to abstain from smoking on day of procedure.  Patient did not smoke on day of procedure.  Education provided regarding risk of obstructive sleep apnea.  intravenous induction   Postoperative administration of opioids is intended.  Anesthetic plan and risks discussed with patient.  Use of blood products discussed with patient who consented to blood products.    Plan discussed with CRNA and CAA.

## 2024-04-25 NOTE — BRIEF OP NOTE
Date: 2024  OR Location: [facility]    Name: Shant Rivera, : 1975, Age: 48 y.o., MRN: [id number], Sex: male    Diagnosis  Pre-op Diagnosis     * Shoulder subluxation, unspecified laterality, subsequent encounter [S43.003D] Post-op Diagnosis     * Shoulder subluxation, unspecified laterality, subsequent encounter [S43.003D]     Procedures  Right biceps tenodesis  Right diagnostic shoulder arthroscopy without biopsy      Surgeons      * Denis Acharya - Primary    Resident/Fellow/Other Assistant:  Aiden Wren MD    Procedure Summary  Anesthesia: General  ASA: II  Anesthesia Staff: Anesthesiologist: Matt Pena DO  C-AA: BEATRICE Uriarte  Estimated Blood Loss: 10-15 mL  Intra-op Medications:   Administrations occurring from 0700 to 0815 on 24:   Medication Name Total Dose   lactated Ringer's infusion Cannot be calculated   clindamycin in D5W (Cleocin) IVPB 900 mg 900 mg              Anesthesia Record               Intraprocedure I/O Totals          Intake    lactated Ringer's infusion 600.00 mL    clindamycin in D5W (Cleocin) IVPB 900 mg 50.00 mL    Total Intake 650 mL       Output    Est. Blood Loss 5 mL    Total Output 5 mL       Net    Net Volume 645 mL          Specimen: No specimens collected     Staff:   Circulator: Jami Robb RN  Scrub Person: Rashida Zhang; Mariann De León    Findings: See full operative report    Complications:  None; patient tolerated the procedure well.     Disposition: PACU - hemodynamically stable.  Condition: stable  Specimens Collected: No specimens collected

## 2024-04-25 NOTE — H&P
History Of Present Illness  Shant Rivera is a 48 y.o. male presenting with right shoulder subluxing biceps tendon.     Past Medical History  Past Medical History:   Diagnosis Date    ADHD (attention deficit hyperactivity disorder)     Anxiety     BPH (benign prostatic hyperplasia)     Disease of esophagus, unspecified 08/15/2013    Disorder of esophagus    Essential (primary) hypertension 05/12/2021    Hypertension    GERD (gastroesophageal reflux disease)     Other conditions influencing health status 03/26/2013    Attention-deficit Hyperactivity Disorder    Personal history of malignant neoplasm, unspecified     History of malignant neoplasm    Personal history of other diseases of the digestive system     History of esophageal reflux    Personal history of other diseases of the respiratory system     Personal history of asthma    Polyp of colon     Benign colonic polyp    Unspecified corneal scar and opacity     Corneal scar, left eye       Surgical History  Past Surgical History:   Procedure Laterality Date    GASTRIC FUNDOPLICATION  09/03/2014    Esophagogastric Fundoplasty Nissen Fundoplication    HERNIA REPAIR  09/03/2014    Inguinal Hernia Repair    OTHER SURGICAL HISTORY  11/16/2021    Strabismus surgery        Social History  He reports that he has quit smoking. His smoking use included cigarettes. He has a 10 pack-year smoking history. He has never used smokeless tobacco. He reports current alcohol use of about 21.0 standard drinks of alcohol per week. He reports that he does not use drugs.    Family History  Family History   Problem Relation Name Age of Onset    Breast cancer Mother      Brain cancer Father      Prostate cancer Maternal Grandfather      Esophageal cancer Paternal Grandmother          Allergies  Penicillins    Review of Systems     Physical Exam     Last Recorded Vitals  Blood pressure (!) 145/92, pulse 76, temperature 36.8 °C (98.2 °F), temperature source Temporal, resp. rate 18,  "height 1.805 m (5' 11.06\"), weight 96.4 kg (212 lb 8.4 oz), SpO2 95%.    Relevant Results        MRI scan confirms partial tear subscapularis and subluxing biceps tendon right shoulder     Assessment/Plan   Principal Problem:    Shoulder subluxation, unspecified laterality, subsequent encounter      Plan is for right shoulder arthroscopy with debridement medial open biceps tenodesis       I spent 15 minutes in the professional and overall care of this patient.      Denis Acharya MD    "

## 2024-04-25 NOTE — PERIOPERATIVE NURSING NOTE
Pt meets criteria for PH2 @ this time. Pt removed from monitors, no other needs identified @ this time. Safety maintained.

## 2024-04-25 NOTE — ANESTHESIA PROCEDURE NOTES
Airway  Date/Time: 4/25/2024 7:14 AM  Urgency: elective    Airway not difficult    Staffing  Performed: BEATRICE   Authorized by: Matt Pena DO    Performed by: BEATRICE Uriarte  Patient location during procedure: OR    Indications and Patient Condition  Indications for airway management: anesthesia and airway protection  Spontaneous Ventilation: absent  Sedation level: deep  Preoxygenated: yes  Patient position: sniffing  MILS maintained throughout  Mask difficulty assessment: 1 - vent by mask  Planned trial extubation    Final Airway Details  Final airway type: endotracheal airway      Successful airway: ETT  Cuffed: yes   Successful intubation technique: direct laryngoscopy  Facilitating devices/methods: intubating stylet  Blade: Britta  Blade size: #3  ETT size (mm): 8.0  Cormack-Lehane Classification: grade I - full view of glottis  Placement verified by: chest auscultation, capnometry and palpation of cuff   Measured from: lips  ETT to lips (cm): 23  Number of attempts at approach: 1           no

## 2024-04-25 NOTE — ANESTHESIA POSTPROCEDURE EVALUATION
Patient: Shant Rivera    Procedure Summary       Date: 04/25/24 Room / Location: [facility]  / [facility]    Anesthesia Start: 0703 Anesthesia Stop: 0803    Procedures:       RIGHT SHOULDER SCOPE DEBRIDMENT AND OPEN BICEPS TENODESIS (Right: Shoulder)      RIGHT SHOULDER SCOPE DEBRIDMENT AND OPEN BICEPS TENODESIS (LINVATEC) (Right: Shoulder) Diagnosis:       Shoulder subluxation, unspecified laterality, subsequent encounter      (Shoulder subluxation, unspecified laterality, subsequent encounter [S43.003D])    Surgeons: Denis Acharya MD Responsible Provider: Matt Pena DO    Anesthesia Type: general, regional ASA Status: 2            Anesthesia Type: general, regional    Vitals Value Taken Time   /85 04/25/24 0810   Temp 37.1 °C (98.8 °F) 04/25/24 0758   Pulse 77 04/25/24 0810   Resp 20 04/25/24 0810   SpO2 96 % 04/25/24 0810       Anesthesia Post Evaluation    Patient location during evaluation: bedside  Patient participation: complete - patient participated  Level of consciousness: awake and alert  Pain management: adequate  Multimodal analgesia pain management approach  Airway patency: patent  Two or more strategies used to mitigate risk of obstructive sleep apnea  Cardiovascular status: acceptable  Respiratory status: acceptable  Hydration status: acceptable  Postoperative Nausea and Vomiting: none        There were no known notable events for this encounter.

## 2024-04-25 NOTE — ANESTHESIA PROCEDURE NOTES
Peripheral Block    Patient location during procedure: pre-op  Start time: 4/25/2024 6:58 AM  End time: 4/25/2024 6:59 AM  Reason for block: at surgeon's request and post-op pain management  Staffing  Performed: attending   Authorized by: Matt Pena DO    Performed by: Matt Pena DO  Preanesthetic Checklist  Completed: patient identified, IV checked, site marked, risks and benefits discussed, surgical consent, monitors and equipment checked, pre-op evaluation and timeout performed   Timeout performed at: 4/25/2024 6:55 AM  Peripheral Block  Patient position: sitting  Prep: ChloraPrep  Patient monitoring: heart rate, cardiac monitor and continuous pulse ox  Block type: interscalene  Laterality: right  Injection technique: single-shot  Guidance: ultrasound guided  Local infiltration: ropivacaine  Infiltration strength: 0.5 %  Dose: 20 mL  Needle  Needle gauge: 22 G  Needle length: 5 cm  Needle localization: ultrasound guidance  Assessment  Injection assessment: negative aspiration for heme, no paresthesia on injection, incremental injection and local visualized surrounding nerve on ultrasound  Paresthesia pain: none  Heart rate change: no  Slow fractionated injection: yes  Additional Notes  With 4mg Decadron

## 2024-04-25 NOTE — PERIOPERATIVE NURSING NOTE
Pt received from OR via cart, monitors on, report received. Pt arousable, follows commands. Pt denies pain, no IV documented on flowsheet. 20G L hand with LR infusing from OR. Remaining assessment completed as documented, orders reviewed/released.

## 2024-04-25 NOTE — OP NOTE
RIGHT SHOULDER SCOPE DEBRIDMENT AND OPEN BICEPS TENODESIS (R), RIGHT SHOULDER SCOPE DEBRIDMENT AND OPEN BICEPS TENODESIS (LINVATEC) (R) Operative Note     Date: 2024  OR Location: [facility]    Name: Shant Rivera, : 1975, Age: 48 y.o., MRN: [id number], Sex: male    Diagnosis  Pre-op Diagnosis     * Shoulder subluxation, unspecified laterality, subsequent encounter [S43.003D] Post-op Diagnosis     * Shoulder subluxation, unspecified laterality, subsequent encounter [S43.003D]     Procedures  RIGHT SHOULDER SCOPE DEBRIDMENT AND OPEN BICEPS TENODESIS  11497 - OR SURGICAL ARTHROSCOPY SHOULDER XTNSV DBRDMT 3+    RIGHT SHOULDER SCOPE DEBRIDMENT AND OPEN BICEPS TENODESIS (LINVATEC)  32651 - OR TENODESIS LONG TENDON BICEPS      Surgeons      * Denis Acharya - Primary    Resident/Fellow/Other Assistant:  Surgeons and Role:  * No surgeons found with a matching role *    Procedure Summary  Anesthesia: General  ASA: II  Anesthesia Staff: Anesthesiologist: Matt Pena DO  C-AA: BEATRICE Uriarte  Estimated Blood Loss: 10-15 mL  Intra-op Medications:   Administrations occurring from 0700 to 0815 on 24:   Medication Name Total Dose   lactated Ringer's infusion Cannot be calculated   clindamycin in D5W (Cleocin) IVPB 900 mg 900 mg              Anesthesia Record               Intraprocedure I/O Totals          Intake    clindamycin in D5W (Cleocin) IVPB 900 mg 50.00 mL    Total Intake 50 mL          Specimen: No specimens collected     Staff:   Circulator: Jami Robb RN  Scrub Person: Rashida Zhang; Mariann De León         Drains and/or Catheters: * None in log *    Tourniquet Times:         Implants:  Implants       Type Name Action Serial No.      Implant KIT, TENOLOK TENODESIS, 6.0MM W/1 P2 SUTURE, 2.4GUIDEPIN, 7.5DRILL BIT - [id number] Implanted               Findings: Partial tear subscapularis superior border.  Subluxed biceps tendon with biceps tendinopathy.  Intact rotator cuff.   Intact glenohumeral articular cartilage.    Indications: Shant Rivera is an 48 y.o. male who is having surgery for Shoulder subluxation, unspecified laterality, subsequent encounter [S42.003D].  Right shoulder pain associated with subluxing biceps tendon partial tear of the subscapularis.  The patient was counseled about options for treatment including surgical and nonoperative treatment potential benefits possible risks rotation sequence were explained and the patient did request and provided informed consent obtained on the date of the procedure.    The patient was seen in the preoperative area. The risks, benefits, complications, treatment options, non-operative alternatives, expected recovery and outcomes were discussed with the patient. The possibilities of reaction to medication, pulmonary aspiration, injury to surrounding structures, bleeding, recurrent infection, the need for additional procedures, failure to diagnose a condition, and creating a complication requiring transfusion or operation were discussed with the patient. The patient concurred with the proposed plan, giving informed consent.  The site of surgery was properly noted/marked if necessary per policy. The patient has been actively warmed in preoperative area. Preoperative antibiotics have been ordered and given within 1 hours of incision. Venous thrombosis prophylaxis have been ordered including bilateral sequential compression devices    Procedure Details: Anesthesia was consulted for postoperative pain management preoperatively status and interscalene nerve block on the right side the patient was then transferred and placed supine on the operating table.  Application confirmed procedure reviewed allergies reviewed and antibiotics administered.  General endotracheal anesthesia was successfully established the patient moved to semisitting position all bony prominences carefully padded sequential pressure devices were placed on patient's legs.   The right extremity sterilely prepped and draped diagnostic arthroscopy was performed this revealed findings noted above the rotator interval portal was created in the biceps tendon intra-articular attachment was released.  An anterior incision was made over the deltopectoral interval brought through skin and subcutaneous tissues deltopectoral interval was entered bluntly and the deltoid retracted exposing the transverse humeral ligament which was incised and biceps tendon was delivered out of the incision biceps tendon was then tenodesed into the middle of the bicipital groove using a Tenolock anchor.  The subcutaneous tissue and skin were then closed with observable sutures and sterile dressing was applied patient's arm was placed in sling and the patient was awakened and transferred to the recovery room there were no complications.      Complications:  None; patient tolerated the procedure well.    Disposition: PACU - hemodynamically stable.  Condition: stable         Additional Details: None    Attending Attestation:     Denis Acharya  Phone Number: [phone]

## 2024-05-01 ENCOUNTER — OFFICE VISIT (OUTPATIENT)
Dept: ORTHOPEDIC SURGERY | Facility: HOSPITAL | Age: 49
End: 2024-05-01
Payer: COMMERCIAL

## 2024-05-01 VITALS — HEIGHT: 71 IN | BODY MASS INDEX: 29.12 KG/M2 | WEIGHT: 208 LBS

## 2024-05-01 DIAGNOSIS — S43.003D: Primary | ICD-10-CM

## 2024-05-01 PROCEDURE — 99024 POSTOP FOLLOW-UP VISIT: CPT | Performed by: PHYSICIAN ASSISTANT

## 2024-05-01 PROCEDURE — 1036F TOBACCO NON-USER: CPT | Performed by: PHYSICIAN ASSISTANT

## 2024-05-01 ASSESSMENT — PAIN SCALES - GENERAL: PAINLEVEL_OUTOF10: 0 - NO PAIN

## 2024-05-01 ASSESSMENT — PAIN - FUNCTIONAL ASSESSMENT: PAIN_FUNCTIONAL_ASSESSMENT: 0-10

## 2024-05-01 NOTE — PROGRESS NOTES
Shant returns to clinic today for first postoperative visit after right shoulder scope with debridement and open biceps tenodesis.  He overall is doing well with well-managed pain.  He does not have any formal physical therapy set up at this time.    Examination: Surgical incisions are healing well no surrounding erythema active drainage or signs of active infection mild anterior ecchymosis light sensation intact  strength 5/5, palpable distal radial pulse.    Impression: Left biceps tendinopathy    Plan: He will continue his sling usage for the next 4 to 5 weeks may come out for hygiene purposes.  May work on light range of motion activity.  Will avoid any resisted biceps movement over the next 5 to 6 weeks.  Will call to set up physical therapy.  He will continue with over-the-counter pain medication as needed.  Will return to office in 3 weeks.    Abbi Sanchez PA-C  Department of Orthopaedic Surgery  [facility]    Dictation performed with the use of voice recognition software. Syntax and grammatical errors may exist.

## 2024-05-03 ASSESSMENT — ENCOUNTER SYMPTOMS
ANAL BLEEDING: 1
ACTIVITY CHANGE: 0
APPETITE CHANGE: 0
DIARRHEA: 1

## 2024-05-03 NOTE — PROGRESS NOTES
HPI    Shant Rivera is a 48 y.o. male who was initially evaluated for hemorrhoids. He was seen by Dr. Cervantes about 10 years ago without intervention and his hemorrhoids resolved on their own. On exam he had a skin tag suggestive of a fissure. On 7/30/21 he underwent EUA, excision of skin tag and biopsy. Pathology showing irritated and inflamed tissue. Saw Dr. Burgos about RUQ pain and HIDA scan was ordered.      He was last seen 5/2023 and at that time he had a chronic appearing area of lichenified skin in anterior perineum despite steroid treatment. He was referred to dermatology.    He was last seen 3/22 and he was noted to have a nonhealing biopsy and ongoing diarrhea. Perianal hygiene and potential excision with closure was discussed. He is s/p shoulder subluxation on 4/25 with Dr. Acharya. He presents today to follow up.     He is overall doing well. He says wound has gotten smaller with Aquaphor use. The affected area is not as sensitive as before.     CT a/p 12/30/14: No CT evidence of acute inflammatory processes in the abdomen and   pelvis.  Possible mild distal colon diverticulosis.    Colonoscopy 6/2020 ([facility])- Normal     Past Medical History:   Diagnosis Date    ADHD (attention deficit hyperactivity disorder)     Anxiety     BPH (benign prostatic hyperplasia)     Disease of esophagus, unspecified 08/15/2013    Disorder of esophagus    Essential (primary) hypertension 05/12/2021    Hypertension    GERD (gastroesophageal reflux disease)     Other conditions influencing health status 03/26/2013    Attention-deficit Hyperactivity Disorder    Personal history of malignant neoplasm, unspecified     History of malignant neoplasm    Personal history of other diseases of the digestive system     History of esophageal reflux    Personal history of other diseases of the respiratory system     Personal history of asthma    Polyp of colon     Benign colonic polyp    Unspecified corneal scar and opacity      Corneal scar, left eye       Past Surgical History:   Procedure Laterality Date    GASTRIC FUNDOPLICATION  09/03/2014    Esophagogastric Fundoplasty Nissen Fundoplication    HERNIA REPAIR  09/03/2014    Inguinal Hernia Repair    OTHER SURGICAL HISTORY  11/16/2021    Strabismus surgery       Allergies   Allergen Reactions    Penicillins Unknown       Review of Systems   Constitutional:  Negative for activity change and appetite change.   Gastrointestinal:  Positive for anal bleeding and diarrhea.       Physical Exam  Vitals reviewed. Exam conducted with a chaperone present.   Constitutional:       Appearance: Normal appearance. He is normal weight.   Genitourinary:     Comments: Non healing perianal wound   Neurological:      Mental Status: He is alert.       Perineum:   Anterior area of lichen sclerosis with nonhealing biopsy site, 3-4mm in size with heaped up edges.   JANAE normal     Assessment and Plan:   Nonhealing of biopsy but he feels significant improvement since last visit. Since he is seeing improvement will give this more time before we move ahead with planning an excision.

## 2024-05-10 ENCOUNTER — OFFICE VISIT (OUTPATIENT)
Dept: SURGERY | Facility: CLINIC | Age: 49
End: 2024-05-10
Payer: COMMERCIAL

## 2024-05-10 ENCOUNTER — EVALUATION (OUTPATIENT)
Dept: PHYSICAL THERAPY | Facility: CLINIC | Age: 49
End: 2024-05-10
Payer: COMMERCIAL

## 2024-05-10 VITALS
SYSTOLIC BLOOD PRESSURE: 148 MMHG | BODY MASS INDEX: 28.56 KG/M2 | WEIGHT: 204 LBS | HEART RATE: 86 BPM | HEIGHT: 71 IN | DIASTOLIC BLOOD PRESSURE: 83 MMHG

## 2024-05-10 DIAGNOSIS — M25.611 DECREASED RANGE OF MOTION OF RIGHT SHOULDER: Primary | ICD-10-CM

## 2024-05-10 DIAGNOSIS — S43.003D: ICD-10-CM

## 2024-05-10 DIAGNOSIS — L30.9 DERMATITIS: Primary | ICD-10-CM

## 2024-05-10 DIAGNOSIS — R29.898 WEAKNESS OF RIGHT SHOULDER: ICD-10-CM

## 2024-05-10 PROBLEM — M25.619 DECREASED RANGE OF MOTION (ROM) OF SHOULDER: Status: ACTIVE | Noted: 2024-05-10

## 2024-05-10 PROCEDURE — 99213 OFFICE O/P EST LOW 20 MIN: CPT | Performed by: SURGERY

## 2024-05-10 PROCEDURE — 3079F DIAST BP 80-89 MM HG: CPT | Performed by: SURGERY

## 2024-05-10 PROCEDURE — 1036F TOBACCO NON-USER: CPT | Performed by: SURGERY

## 2024-05-10 PROCEDURE — 3077F SYST BP >= 140 MM HG: CPT | Performed by: SURGERY

## 2024-05-10 PROCEDURE — 97161 PT EVAL LOW COMPLEX 20 MIN: CPT | Mod: GP | Performed by: PHYSICAL THERAPIST

## 2024-05-10 PROCEDURE — 97110 THERAPEUTIC EXERCISES: CPT | Mod: GP | Performed by: PHYSICAL THERAPIST

## 2024-05-10 ASSESSMENT — PATIENT HEALTH QUESTIONNAIRE - PHQ9
SUM OF ALL RESPONSES TO PHQ9 QUESTIONS 1 AND 2: 0
1. LITTLE INTEREST OR PLEASURE IN DOING THINGS: NOT AT ALL
2. FEELING DOWN, DEPRESSED OR HOPELESS: NOT AT ALL

## 2024-05-10 ASSESSMENT — PAIN SCALES - GENERAL: PAINLEVEL_OUTOF10: 0 - NO PAIN

## 2024-05-10 ASSESSMENT — ENCOUNTER SYMPTOMS
LOSS OF SENSATION IN FEET: 0
OCCASIONAL FEELINGS OF UNSTEADINESS: 0
DEPRESSION: 0

## 2024-05-10 ASSESSMENT — ACTIVITIES OF DAILY LIVING (ADL): ADL_ASSISTANCE: INDEPENDENT

## 2024-05-10 NOTE — PROGRESS NOTES
Physical Therapy  Physical Therapy Evaluation  Patient Name: Shant Rivera  MRN: [id number]  Today's Date: 5/10/2024    Time Calculation  Start Time: 1343  Stop Time: 1428  Time Calculation (min): 45 min    General  Reason for Referral: R shoulder scope with biceps tenodesis  Referred By: Abbi Sanchez PA-C  Past Medical History Relevant to Rehab: HTN  General Comment: $500 DED (MET)/ 80% COVERAGE/ NO AUTH/ 20 VISITS/ - Onset Surgery date 4/25/24, Visit 1   1. Decreased range of motion of right shoulder        2. Shoulder subluxation, unspecified laterality, subsequent encounter  Referral to Physical Therapy    Follow Up In Physical Therapy      3. Weakness of right shoulder          Precautions  STEADI Fall Risk Score (The score of 4 or more indicates an increased risk of falling): 0  Precautions Comment: Per lsat note from Abbi Sanchez PA-C: He will continue his sling usage for the next 4 to 5 weeks may come out for hygiene purposes. May work on light range of motion activity. Will avoid any resisted biceps movement over the next 5 to 6 weeks. - message was sent on 5/10/247 to clarify        Assessment:  Patient is a 48 year old who presents to Physical therapy secondary to R shoulder scope with debridement and open biceps tenodesis on 4/25/24 . The patient is wearing sling and reported minimal pain at rest.  Upon PT evaluation the patient is presenting with the following deficits:  PROM was tested and is limited in all planes with mild tingling in R biceps with passive shoulder flexion (all performed within pain free range). Will assess AROM as protocol allows.   The above deficits are limiting patient's ability to perform ADLS.  Secondary to the above deficits the patient will benefit from skilled PT intervention to allow the patient to progress to the goal of returning to full use of R UE .  PT will monitor progress towards goals and adjust intervention as appropriate.      PT Assessment  PT Assessment Results:  Decreased range of motion, Decreased strength, Pain, Orthopedic restrictions  Strengths: Ability to acquire knowledge, Access to adaptive/assistive products, Attitude of self    Medical History Form: Reviewed and scanned into chart     Plan:  OP PT Plan  Treatment/Interventions: Cryotherapy, Education/ Instruction, Manual therapy, Therapeutic activities, Therapeutic exercises  PT Plan: Skilled PT  PT Frequency: 2 times per week  Duration: 6 weeks  Number of Treatments Authorized: 20 visits  Rehab Potential: Excellent  Plan of Care Agreement: Patient  Plan of care discussed with patient and patient agrees with plan      Patient is s/p right shoulder scope with debridement and open biceps tenodesis on 4/25/24  Pain  Pain Assessment  Pain Score: 0 - No pain  Patient has not had to take pain meds for this last week   Pain has been very well controlled over all and only had minimal discomfort at times.       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)  Hand Dominance: Right        Goals:  Goals were developed with patient input   Active       PT Problem       Patient will demonstrate improved R shoulder strength to be able to move R UE against full ROM       Start:  05/10/24    Expected End:  07/05/24            Patient will demonstrate improved shoulder ROM to WFL to allow for ability to return to daily activities without compensation       Start:  05/10/24    Expected End:  07/05/24            Patient will be able to perform all daily activities without reports of pain        Start:  05/10/24    Expected End:  07/05/24            Patient will demonstrate improved score on Quick DASH  by 10 points to demonstrate improved subjective functional mobility and ability to perform daily activities.          Start:  05/10/24    Expected End:  07/05/24            Patient  will demonstrate independence and compliance with safe and appropriate HEP for pain reduction, ROM/flexibility, and strength       Start:  05/10/24    Expected End:  07/05/24              Objective:   Shoulder Functional Rating Scale  Quick Dash: 38.64  Shoulder Observation  Cervical Posture: WNL  Shoulder Observation Comment: wearing sling initially, mild edema along proximal biceps, incisions clsed without drainage and stei strips intact     Cervical AROM  Cervical AROM WFL: yes  Shoulder AROM    Shoulder AROM WFL:  (AROM NT per protocol)  Shoulder PROM  R shoulder flexion: (180°): 136  R shoulder extension: (60°): 10  R shoulder abduction: (180°): 118  R shoulder ER: (90°): 15     Elbow PROM  R elbow flexion: (140°): 140  R elbow extension:(0°): 0  Elbow Strength  Elbow Strength WFL:  (NT per protocol)        Treatment:   Low Complexity Eval: 30 minutes  Therapeutic Exercise:   15 MINUTES  Therapeutic exercises completed this date to improve strength and postural control to improve ability to perform functional activities safely.         See education section    Education:  Outpatient Education  Individual(s) Educated: Patient  Education Provided: POC, Signs/Symptoms of Infection, Post-Op Precautions, Home Exercise Program  Patient/Caregiver Demonstrated Understanding: yes  Plan of Care Discussed and Agreed Upon: yes  Patient Response to Education: Patient/Caregiver Verbalized Understanding of Information, Patient/Caregiver Performed Return Demonstration of Exercises/Activities  Education Comment: Educated patient to perform PROM at this time and was provided with HEP for pendulums, PROM of elbow, and slef ROM for flexion and abduction of the shoulderAccess Code: [email or web address]  URL: [email or web address]  Date: 05/10/2024  Prepared by: Elizabeth Whyte    Exercises  - Elbow Flexion PROM  - 2 x daily - 7 x weekly - 2 sets - 10 reps  - Flexion-Extension Shoulder Pendulum with Table Support  - 2 x daily  - 7 x weekly - 3 sets - 10 reps  - Circular Shoulder Pendulum with Table Support  - 1 x daily - 7 x weekly - 2 sets - 10 reps    Elizabeth Whyte, PT

## 2024-05-24 ENCOUNTER — APPOINTMENT (OUTPATIENT)
Dept: ORTHOPEDIC SURGERY | Facility: HOSPITAL | Age: 49
End: 2024-05-24
Payer: COMMERCIAL

## 2024-05-24 DIAGNOSIS — I15.9 SECONDARY HYPERTENSION: ICD-10-CM

## 2024-05-24 RX ORDER — HYDROCHLOROTHIAZIDE 12.5 MG/1
12.5 TABLET ORAL DAILY
Qty: 90 TABLET | Refills: 3 | Status: SHIPPED | OUTPATIENT
Start: 2024-05-24

## 2024-05-28 DIAGNOSIS — M25.579 ARTHRALGIA OF ANKLE, UNSPECIFIED LATERALITY: ICD-10-CM

## 2024-05-28 RX ORDER — IBUPROFEN 800 MG/1
800 TABLET ORAL EVERY 6 HOURS PRN
Qty: 60 TABLET | Refills: 3 | Status: SHIPPED | OUTPATIENT
Start: 2024-05-28

## 2024-06-03 ENCOUNTER — DOCUMENTATION (OUTPATIENT)
Dept: PHYSICAL THERAPY | Facility: CLINIC | Age: 49
End: 2024-06-03
Payer: COMMERCIAL

## 2024-06-03 ENCOUNTER — TREATMENT (OUTPATIENT)
Dept: PHYSICAL THERAPY | Facility: CLINIC | Age: 49
End: 2024-06-03
Payer: COMMERCIAL

## 2024-06-03 DIAGNOSIS — S43.003D: ICD-10-CM

## 2024-06-03 NOTE — PROGRESS NOTES
Physical Therapy    Physical Therapy Treatment    Patient Name: Shant Rivera  MRN: [id number]    Today's Date: 6/3/2024                            Assessment:     Plan:       Current Problem  No diagnosis found.    General          Subjective    The patient notes his right shoulder is feeling good. Notes 2/10 pain in R shoulder today, notes the highest it has been is a 4/10.   Precautions     Vital Signs     Pain       Objective   Cognition     Coordination:     Posture     Extremity/Trunk Assessment  {Extremity/Trunk Assessments:35853}  {Spine,UE,LE,HAND,LYMPHEDEMA:46960}  Activity Tolerance:     Outcome Measures:  {PT Outcome Measures:12356}  Treatments:  {PT Treatments:32906}    OP EDUCATION:       Goals:  Active       PT Problem       Patient will demonstrate improved R shoulder strength to be able to move R UE against full ROM       Start:  05/10/24    Expected End:  07/05/24            Patient will demonstrate improved shoulder ROM to WFL to allow for ability to return to daily activities without compensation       Start:  05/10/24    Expected End:  07/05/24            Patient will be able to perform all daily activities without reports of pain        Start:  05/10/24    Expected End:  07/05/24            Patient will demonstrate improved score on Quick DASH  by 10 points to demonstrate improved subjective functional mobility and ability to perform daily activities.          Start:  05/10/24    Expected End:  07/05/24            Patient will demonstrate independence and compliance with safe and appropriate HEP for pain reduction, ROM/flexibility, and strength       Start:  05/10/24    Expected End:  07/05/24

## 2024-06-03 NOTE — PROGRESS NOTES
Physical Therapy                     Therapy Communication Note    Patient Name: Shant Rivera  MRN: [id number]  Today's Date: 6/3/2024   Time: 14:31   Discipline: Physical Therapy      Missed Time: Cancel- PT and patient came to mutual decision to cancel therapy session pending further instruction and guidance from patient's surgeon/PA.     Comment: Patient presented to therapy this date for 1st session following evaluation. Patient is currently 5 weeks, 4 days post-op. Notes he was supposed to see surgeon for follow up sooner than next week, but it was cancelled by surgeon's office.     This PT spoke to evaluation PT, Elizabeth Whyte who reported that PA Francisca Sanchez did not respond when she was messaged on day of evaluation regarding specific guidelines for mobility progression and no protocol was available.     Today, the patient notes his right shoulder is feeling good. Notes 2/10 pain in R shoulder today at worst recently, notes 0/10 pain in R shoulder currently. Patient presented to therapy without wearing sling.  Patient notes he slept in sling for 8 nights after surgery. Notes he stopped using it at night after that. Notes that he discontinued wearing sling in public last week. Can put his hand behind back, is using R arm to wash hair.   Notes surgeon told him 2 weeks for sling, but PA had told him 4-5 weeks.   Notes he has been lifting groceries with R arm and built a table this weekend. Is actively moving R arm. Notes no pain in R arm. Notes he does not feel challenged by PROM activities, as he has been actively moving R arm. Returns to see surgeon on Monday of next week.     Decision made to hold therapy session this date until further guidance from surgeon following follow up next week. Patient was educated to avoid lifting/active mobility of R shoulder until he sees surgeon in order to avoid potential re-injury of operative site. Patient reported understanding. No exercises completed this date.

## 2024-06-05 ENCOUNTER — APPOINTMENT (OUTPATIENT)
Dept: ORTHOPEDIC SURGERY | Facility: HOSPITAL | Age: 49
End: 2024-06-05
Payer: COMMERCIAL

## 2024-06-06 ENCOUNTER — APPOINTMENT (OUTPATIENT)
Dept: PHYSICAL THERAPY | Facility: CLINIC | Age: 49
End: 2024-06-06
Payer: COMMERCIAL

## 2024-06-10 ENCOUNTER — TREATMENT (OUTPATIENT)
Dept: PHYSICAL THERAPY | Facility: CLINIC | Age: 49
End: 2024-06-10
Payer: COMMERCIAL

## 2024-06-10 ENCOUNTER — OFFICE VISIT (OUTPATIENT)
Dept: ORTHOPEDIC SURGERY | Facility: HOSPITAL | Age: 49
End: 2024-06-10
Payer: COMMERCIAL

## 2024-06-10 DIAGNOSIS — S43.003A SUBLUXATION OF TENDON OF LONG HEAD OF BICEPS: ICD-10-CM

## 2024-06-10 DIAGNOSIS — R29.898 WEAKNESS OF RIGHT SHOULDER: ICD-10-CM

## 2024-06-10 DIAGNOSIS — S43.003D: ICD-10-CM

## 2024-06-10 DIAGNOSIS — M25.611 DECREASED RANGE OF MOTION OF RIGHT SHOULDER: Primary | ICD-10-CM

## 2024-06-10 PROCEDURE — 99024 POSTOP FOLLOW-UP VISIT: CPT | Performed by: ORTHOPAEDIC SURGERY

## 2024-06-10 PROCEDURE — 97110 THERAPEUTIC EXERCISES: CPT | Mod: GP

## 2024-06-10 NOTE — PROGRESS NOTES
Almost 6 weeks status post biceps tenodesis right shoulder is doing very well I put together a table last week.    Right shoulder exam incision is healed well.  He has full range of motion of the right shoulder.  He has no pain on contraction of the biceps against resistance.    Right shoulder bicipital tendinopathy and subluxing biceps    Patient doing well status post right shoulder surgery.  He should begin strengthening with therapy prescription was provided today follow-up in 1 month for strength testing.    This was dictated using voice recognition software and not corrected for grammatical or spelling errors.

## 2024-06-10 NOTE — PROGRESS NOTES
Physical Therapy    Physical Therapy Treatment    Patient Name: Shant Rivera  MRN: [id number]    Today's Date: 6/10/2024  Time Calculation  Start Time: 1434  Stop Time: 1513  Time Calculation (min): 39 min     Assessment:   Session this date focused on addressing right shoulder range of motion as well as gentle introduction to biceps and general GH strengthening. Patient heavily educated on importance of completing exercises within pain free ROM and avoiding overuse of RUE. Patient given updated HEP and educated on safe completion of HEP. Patient noted most difficulty completing L shoulder abduction wand AAROM, but denied any increase in discomfort. Patient noted no increased pain at end of session. Patient would likely continue to benefit from skilled PT services.     Plan:   Progress POC as able and tolerated by patient. Adjust plan as needed.      Current Problem  1. Decreased range of motion of right shoulder        2. Shoulder subluxation, unspecified laterality, subsequent encounter  Follow Up In Physical Therapy      3. Weakness of right shoulder            General     General  Reason for Referral: R shoulder scope with biceps tenodesis  Referred By: Abbi Sanchez PA-C  Past Medical History Relevant to Rehab: HTN  General Comment: $500 DED (MET)/ 80% COVERAGE/ NO AUTH/ 20 VISITS/ - Onset Surgery date 4/25/24, Visit 2    Subjective    The patient notes he went to see surgeon today and he was pleased with his ROM. Pt notes he told surgeon that he has not done much PT due to lack of guidance on post-op precautions. Notes he was told he could start strengthening of biceps today. Notes R shoulder is sore from moving arm around this weekend; was hitting ground balls to son with R arm but kept elbow in at his side.     Patient is 6 weeks, 4 days post-op.     Precautions  Precautions  STEADI Fall Risk Score (The score of 4 or more indicates an increased risk of falling): 0    Per Dr. Acharya's note from 6/10/24:  "\"Continue PT and start progressive strengthening of the biceps.\"     Pain   2/10 soreness in R shoulder at start of session. Patient noted no increased pain at end of session and left therapy in no distress.     Objective   Patient presents to therapy not wearing R shoulder sling.     Note from Dr. Acharya from 6/10/24: \"Almost 6 weeks status post biceps tenodesis right shoulder is doing very well I put together a table last week.     Right shoulder exam incision is healed well.  He has full range of motion of the right shoulder.  He has no pain on contraction of the biceps against resistance.     Right shoulder bicipital tendinopathy and subluxing biceps     Patient doing well status post right shoulder surgery.  He should begin strengthening with therapy prescription was provided today follow-up in 1 month for strength testing.\"      Treatments:  Therapeutic Exercise (87490)- 39 min   Supine:   -BUE AAROM wand flexion x 10 reps, 5 sec hold  -RUE AAROM wand ER with PVC pipe x 10 reps, 5 sec hold  Standing:   -RUE AAROM wand abduction x 10 reps, 5 sec hold; pt noted this was more challenging, but noted no increased pain   Seated:   -B bicep curls with 2# hand weights x 10 reps (pt noted this was not challenging)  -B biceps curls with B 3# hand weights x 10 reps (pt noted no pain, but noted it felt more challenging than 2#)   Standing:   - B hammer (brachioradialis) curls with B 2# hand weights 2 x 10 reps   - B brachialis curls (pronated) with B 2# hand weights 1 x 10 reps   -B shoulder flexion to 90 deg with B 2# hand weights x 10 reps, 3-5 sec hold     OP EDUCATION:   Correct exercise technique in pain free ROM. Gradual ease into strengthening of biceps in order to avoid injury.     Access Code: [email or web address]  URL: [email or web address]  Date: 06/10/2024  Prepared by: JOSE Strickland CA    Program Notes  Complete all exercises in pain free range of motion. Hold any exercise from program that causes " increased discomfort.     Exercises  - Supine Shoulder Flexion Extension AAROM with Dowel  - 1-2 x daily - 7 x weekly - 1 sets - 10 reps - 5 sec hold  - Supine Shoulder External Rotation with Dowel  - 1-2 x daily - 7 x weekly - 1 sets - 10 reps - 5 sec hold  - Standing Shoulder Abduction AAROM with Dowel  - 1-2 x daily - 7 x weekly - 1 sets - 10 reps - 5 sec hold  - Standing Bicep Curls Supinated with Dumbbells  - 1-2 x daily - 7 x weekly - 1 sets - 10 reps (2-3# hand weights max, or pt educated he could use standard soup can)   Goals:  Active       PT Problem       Patient will demonstrate improved R shoulder strength to be able to move R UE against full ROM       Start:  05/10/24    Expected End:  07/05/24            Patient will demonstrate improved shoulder ROM to WFL to allow for ability to return to daily activities without compensation       Start:  05/10/24    Expected End:  07/05/24            Patient will be able to perform all daily activities without reports of pain        Start:  05/10/24    Expected End:  07/05/24            Patient will demonstrate improved score on Quick DASH  by 10 points to demonstrate improved subjective functional mobility and ability to perform daily activities.          Start:  05/10/24    Expected End:  07/05/24            Patient will demonstrate independence and compliance with safe and appropriate HEP for pain reduction, ROM/flexibility, and strength       Start:  05/10/24    Expected End:  07/05/24

## 2024-06-13 ENCOUNTER — APPOINTMENT (OUTPATIENT)
Dept: PHYSICAL THERAPY | Facility: CLINIC | Age: 49
End: 2024-06-13
Payer: COMMERCIAL

## 2024-06-17 ENCOUNTER — CLINICAL SUPPORT (OUTPATIENT)
Dept: PHYSICAL THERAPY | Facility: CLINIC | Age: 49
End: 2024-06-17
Payer: COMMERCIAL

## 2024-06-17 DIAGNOSIS — R29.898 WEAKNESS OF RIGHT SHOULDER: ICD-10-CM

## 2024-06-17 DIAGNOSIS — S43.003D: ICD-10-CM

## 2024-06-17 DIAGNOSIS — M25.611 DECREASED RANGE OF MOTION OF RIGHT SHOULDER: Primary | ICD-10-CM

## 2024-06-17 PROCEDURE — 97110 THERAPEUTIC EXERCISES: CPT | Mod: GP | Performed by: PHYSICAL THERAPIST

## 2024-06-17 NOTE — PROGRESS NOTES
Physical Therapy                                                                                  Physical Therapy Treatment       Patient name: Shant Rivera  MRN:   [id number]  Today's Date: 6/17/24  3         No diagnosis found.          Assessment:  Patient completed session today with *** effort and had difficulty with ***.  Patient demonstrated improvement with ***.   Patient will continue to benefit from PT to improve *** to work towards goals of ***.      Plan:        Monitor response to treatment and adjust plan as needed.   To continue with current POC with emphasis on ***.           Subjective:  Patient reported ***.     Response to last session:     Performing HEP: ***     Pain     Objective:     Treatment:    ***  Supine:   -BUE AAROM wand flexion x 10 reps, 5 sec hold  -RUE AAROM wand ER with PVC pipe x 10 reps, 5 sec hold  Standing:   -RUE AAROM wand abduction x 10 reps, 5 sec hold; pt noted this was more challenging, but noted no increased pain   Seated:   -B bicep curls with 2# hand weights x 10 reps (pt noted this was not challenging)  -B biceps curls with B 3# hand weights x 10 reps (pt noted no pain, but noted it felt more challenging than 2#)   Standing:   - B hammer (brachioradialis) curls with B 2# hand weights 2 x 10 reps   - B brachialis curls (pronated) with B 2# hand weights 1 x 10 reps   -B shoulder flexion to 90 deg with B 2# hand weights x 10 reps, 3-5 sec hold     -scapular retraction with green tband  -standing shoulder abduction  -shoulder ER   -triceps with tband   -wall circles     Education:   Access Code: [email or web address]     Elizabeth Whyte, PT    Active       PT Problem       Patient will demonstrate improved R shoulder strength to be able to move R UE against full ROM       Start:  05/10/24    Expected End:  07/05/24            Patient will demonstrate improved shoulder ROM to WFL to allow for ability to return to daily activities without compensation       Start:  05/10/24    Expected  End:  07/05/24            Patient will be able to perform all daily activities without reports of pain        Start:  05/10/24    Expected End:  07/05/24            Patient will demonstrate improved score on Quick DASH  by 10 points to demonstrate improved subjective functional mobility and ability to perform daily activities.          Start:  05/10/24    Expected End:  07/05/24            Patient will demonstrate independence and compliance with safe and appropriate HEP for pain reduction, ROM/flexibility, and strength       Start:  05/10/24    Expected End:  07/05/24

## 2024-06-17 NOTE — PROGRESS NOTES
"Physical Therapy    Physical Therapy Treatment    Patient Name: Shant Rivera  MRN: [id number]  Today's Date: 6/17/2024  Time Calculation  Start Time: 0845  Stop Time: 0930  Time Calculation (min): 45 min     Assessment:   Session this date focused on addressing right shoulder range of motion as well as gentle introduction to biceps and general GH strengthening. Patient heavily educated on importance of completing exercises within pain free ROM and avoiding overuse of RUE.  Patient denied any increase in pain or discomfort of R shoulder during or after exercises. Patient would likely continue to benefit from skilled PT services.     Plan:   Progress POC as able and tolerated by patient. Adjust plan as needed.      Current Problem  1. Decreased range of motion of right shoulder        2. Shoulder subluxation, unspecified laterality, subsequent encounter  Follow Up In Physical Therapy      3. Weakness of right shoulder                Subjective    Patient with no complaint of R shoulder pain.  Surgeon is pleased with his ROM, needs progressive strengthening of his bicep muscle.    Precautions  Patient is 7 weeks post-op Bicep tenodesis. Allowed to do light strengthening of R shoulder / bicep and progress as tolerated.  Patient does have history of R shoulder subluxation / dislocation.   Per Dr. Acharya's note from 6/10/24: \"Continue PT and start progressive strengthening of the biceps.\"     Pain   0/10 pain level at start of session. Patient noted no increased pain at end of session and left therapy in no distress.     Objective   Patient 7 weeks post-op, started strengthening of R shoulder / bicep last week.  AROM of R shoulder is good.    Treatments:  Therapeutic Exercise (92978)- 39 min   Supine:   -BUE AAROM with 1# wand into flexion x 20 reps, 5 sec hold  -RUE AAROM into R shoulder ER with wand, arm in neutral 5 sec hold, then abducted ~45 degrees 5 sec hold 1 x 10 reps each.  -L side lying for R shoulder ER " "with 2# hand wt 3 x 10 reps, towel roll under R upper arm   -Supine B Shoulder horizontal abduction with orange theraband 1 x 10 reps palms down and palms up each  Standing:   - B hammer (brachioradialis) curls with B 2# hand weights 2 x 10 reps   - B brachialis curls (pronated) with B 2# hand weights 2 x 10 reps   - B biceps curl palm up with 2# hand weights 2 x 10 reps  - B shoulder flexion \"I\" to 90 deg with B 2# hand weights 2 x 10 reps, 3-5 sec hold   - B shoulder scaption \"Y\" to 90 degrees with 2# hand weights 2 x 10 reps    OP EDUCATION:   Correct exercise technique in pain free ROM. Gradual ease into strengthening of biceps in order to avoid injury.     Access Code: [email or web address]  URL: [email or web address]  Date: 06/10/2024  Prepared by: JOSE Strickland CA    Program Notes  Complete all exercises in pain free range of motion. Hold any exercise from program that causes increased discomfort.     Exercises  - Supine Shoulder Flexion Extension AAROM with Dowel  - 1-2 x daily - 7 x weekly - 1 sets - 10 reps - 5 sec hold  - Supine Shoulder External Rotation with Dowel  - 1-2 x daily - 7 x weekly - 1 sets - 10 reps - 5 sec hold  - Standing Shoulder Abduction AAROM with Dowel  - 1-2 x daily - 7 x weekly - 1 sets - 10 reps - 5 sec hold  - Standing Bicep Curls Supinated with Dumbbells  - 1-2 x daily - 7 x weekly - 1 sets - 10 reps (2-3# hand weights max, or pt educated he could use standard soup can)   Goals:  Active       PT Problem       Patient will demonstrate improved R shoulder strength to be able to move R UE against full ROM       Start:  05/10/24    Expected End:  07/05/24            Patient will demonstrate improved shoulder ROM to WFL to allow for ability to return to daily activities without compensation       Start:  05/10/24    Expected End:  07/05/24            Patient will be able to perform all daily activities without reports of pain        Start:  05/10/24    Expected End:  07/05/24   "          Patient will demonstrate improved score on Quick DASH  by 10 points to demonstrate improved subjective functional mobility and ability to perform daily activities.          Start:  05/10/24    Expected End:  07/05/24            Patient will demonstrate independence and compliance with safe and appropriate HEP for pain reduction, ROM/flexibility, and strength       Start:  05/10/24    Expected End:  07/05/24

## 2024-06-20 ENCOUNTER — APPOINTMENT (OUTPATIENT)
Dept: PHYSICAL THERAPY | Facility: CLINIC | Age: 49
End: 2024-06-20
Payer: COMMERCIAL

## 2024-06-26 NOTE — PROGRESS NOTES
HPI    Shant Rivera is a 48 y.o. male who a non-healing perianal wound.  He was seen by Dr. Molina about 10 years ago without intervention and his hemorrhoids resolved on their own. On exam he had a skin tag suggestive of a fissure. On 7/30/21 he underwent EUA, excision of skin tag and biopsy. Pathology showing irritated and inflamed tissue. He was last seen 5/10/24 at which time perineal exam revealed anterior area of lichen sclerosis with nonhealing biopsy site, 3-4mm in size.      He states that his bottom is the worst its ever been for him because he traveled recently.      CT a/p 12/30/14     Colonoscopy 6/2020 ([facility])- Normal     Non-smoker/No ETOH/No Illicit drug use  PMH:   PSH:  No family history of CRC or IBD  Employment:     Past Medical History:   Diagnosis Date    ADHD (attention deficit hyperactivity disorder)     Anxiety     Asthma (HHS-HCC)     Edgar's esophagus     BPH (benign prostatic hyperplasia)     GERD (gastroesophageal reflux disease)     HTN (hypertension)     Hypertriglyceridemia     Unspecified corneal scar and opacity     Corneal scar, left eye       Past Surgical History:   Procedure Laterality Date    GASTRIC FUNDOPLICATION  09/03/2014    Esophagogastric Fundoplasty Nissen Fundoplication    HERNIA REPAIR  09/03/2014    Inguinal Hernia Repair    OTHER SURGICAL HISTORY  11/16/2021    Strabismus surgery       Allergies   Allergen Reactions    Penicillins Unknown       Review of Systems    Physical Exam    Assessment and Plan:   Plan for excision of biopsy site with closure. Discussed that he would have absorbable sutures that stick out. Discussed toileting and activity restrictions to minimize edema and minimize risk of poor healing.   He wants to wait until October - I think that's reasonable as it will promote a better postop course given work responsibilities.

## 2024-06-28 ENCOUNTER — HOSPITAL ENCOUNTER (OUTPATIENT)
Facility: HOSPITAL | Age: 49
Setting detail: OUTPATIENT SURGERY
End: 2024-06-28
Attending: SURGERY | Admitting: SURGERY
Payer: COMMERCIAL

## 2024-06-28 ENCOUNTER — OFFICE VISIT (OUTPATIENT)
Dept: SURGERY | Facility: CLINIC | Age: 49
End: 2024-06-28
Payer: COMMERCIAL

## 2024-06-28 ENCOUNTER — PREP FOR PROCEDURE (OUTPATIENT)
Dept: SURGERY | Facility: HOSPITAL | Age: 49
End: 2024-06-28

## 2024-06-28 VITALS — HEIGHT: 71 IN | WEIGHT: 201 LBS | BODY MASS INDEX: 28.14 KG/M2

## 2024-06-28 DIAGNOSIS — L98.9 LESION OF MALE PERINEUM: Primary | ICD-10-CM

## 2024-06-28 DIAGNOSIS — R10.2 PERINEUM PAIN, MALE: Primary | ICD-10-CM

## 2024-06-28 PROCEDURE — 99213 OFFICE O/P EST LOW 20 MIN: CPT | Performed by: SURGERY

## 2024-07-01 ENCOUNTER — TREATMENT (OUTPATIENT)
Dept: PHYSICAL THERAPY | Facility: CLINIC | Age: 49
End: 2024-07-01
Payer: COMMERCIAL

## 2024-07-01 DIAGNOSIS — R29.898 WEAKNESS OF RIGHT SHOULDER: ICD-10-CM

## 2024-07-01 DIAGNOSIS — M25.611 DECREASED RANGE OF MOTION OF RIGHT SHOULDER: Primary | ICD-10-CM

## 2024-07-01 DIAGNOSIS — S43.003D: ICD-10-CM

## 2024-07-01 PROCEDURE — 97110 THERAPEUTIC EXERCISES: CPT | Mod: GP | Performed by: PHYSICAL THERAPIST

## 2024-07-01 NOTE — PROGRESS NOTES
"Physical Therapy                                                                                  Physical Therapy Treatment       Patient name: Shant Rivera  MRN:   [id number]  Today's Date: 7/1/24     Time Calculation  Start Time: 0928  Stop Time: 0956  Time Calculation (min): 28 min  1. Decreased range of motion of right shoulder        2. Weakness of right shoulder        3. Shoulder subluxation, unspecified laterality, subsequent encounter  Follow Up In Physical Therapy          PT  Visit  PT Received On: 07/01/24  General  Reason for Referral: R shoulder scope with biceps tenodesis  Referred By: Abbi Sanchez PA-C  Past Medical History Relevant to Rehab: HTN  General Comment: $500 DED (MET)/ 80% COVERAGE/ NO AUTH/ 20 VISITS/ - Onset Surgery date 4/25/24, Visit 4  Assessment:  Patient completed session today with minimal effort and  tolerated exercises well.  Patient demonstrated improvement with ability to gradually progress shoulder resistance exercises today with light resistance (orange band) and was educated on increasing repetitions gradually and slow controled movement.  Patient has very good technique with exercises and was provided HEP.       Plan:        Monitor response to treatment and adjust plan as needed.   To continue with current POC with emphasis on gradually increase resistance exercises.           Subjective:  Patient reported he is doing well.  He only has pain when \":over do ing it\".  \"Putting it in odd positions\".          PT  Visit  PT Received On: 07/01/24  Performing HEP: yes (using cans as he does not have light dumbbells at home)   Precautions  Precautions Comment: Per Dr. Acharya's note from 6/10/24: \"Continue PT and start progressive strengthening of the biceps.\"  Pain   0/10 at rest     Treatment:    Therapeutic Exercise:   28 MINUTES  Therapeutic exercises completed this date to improve strength and postural control to improve ability to perform functional activities safely.     " "    -Supine B Shoulder horizontal abduction with orange theraband 1 x 10 reps palms down and palms up each  Standing:   - B hammer (brachioradialis) curls with B 2# hand weights 2 x 10 reps   - B brachialis curls (pronated) with B 2# hand weights 2 x 10 reps   - B biceps curl palm up with 2# hand weights 2 x 10 reps  - B shoulder flexion \"I\" to 90 deg with B 2# hand weights 2 x 10 reps, 3-5 sec hold   - B shoulder scaption \"Y\" to 90 degrees with 2# hand weights 2 x 10 reps  -tricep extension with orange band 2 x 15   - Shoulder ER with orange band  - Shoulder IR with orange band   -bicep curls with orange tband 2 x 15   Education:     Educated on focus on low load high repetition before advancing weight.  Importance of slow progression.     Access Code: [email or web address]  URL: [email or web address]  Date: 07/01/2024  Prepared by: Elizabeth Whyte    Program Notes  Complete all exercises in pain free range of motion. Hold any exercise from program that causes increased discomfort. Continue with low weight but increase repetitions    Exercises  - Standing Bicep Curls Supinated with Dumbbells  - 1-2 x daily - 7 x weekly - 2 sets - 10 - 20 reps  - Standing Elbow Extension with Self-Anchored Resistance  - 1 x daily - 7 x weekly - 2 sets - 10 -20 reps  - Shoulder Internal Rotation with Resistance  - 1 x daily - 7 x weekly - 2 sets - 10-20 reps  - Shoulder External Rotation with Anchored Resistance  - 1 x daily - 7 x weekly - 2 sets - 10 - 20 reps  - Standing Elbow Flexion with Self-Anchored Resistance  - 1 x daily - 7 x weekly - 2 sets - 10 - 20 reps  - Supine Shoulder Horizontal Abduction with Resistance  - 1 x daily - 7 x weekly - 2 sets - 15-20 reps  - Standing Shoulder Flexion to 90 Degrees with Dumbbells  - 1 x daily - 7 x weekly - 2 sets - 15-20 reps  - Scaption with Dumbbells  - 1 x daily - 7 x weekly - 2 sets - 15-20 reps  Elizabeth Whyte, PT    Active       PT Problem       Patient will demonstrate " improved R shoulder strength to be able to move R UE against full ROM       Start:  05/10/24    Expected End:  07/05/24            Patient will demonstrate improved shoulder ROM to WFL to allow for ability to return to daily activities without compensation       Start:  05/10/24    Expected End:  07/05/24            Patient will be able to perform all daily activities without reports of pain        Start:  05/10/24    Expected End:  07/05/24            Patient will demonstrate improved score on Quick DASH  by 10 points to demonstrate improved subjective functional mobility and ability to perform daily activities.          Start:  05/10/24    Expected End:  07/05/24            Patient will demonstrate independence and compliance with safe and appropriate HEP for pain reduction, ROM/flexibility, and strength       Start:  05/10/24    Expected End:  07/05/24

## 2024-07-08 ENCOUNTER — TREATMENT (OUTPATIENT)
Dept: PHYSICAL THERAPY | Facility: CLINIC | Age: 49
End: 2024-07-08
Payer: COMMERCIAL

## 2024-07-08 DIAGNOSIS — R29.898 WEAKNESS OF RIGHT SHOULDER: ICD-10-CM

## 2024-07-08 DIAGNOSIS — S43.003D: ICD-10-CM

## 2024-07-08 DIAGNOSIS — M25.611 DECREASED RANGE OF MOTION OF RIGHT SHOULDER: Primary | ICD-10-CM

## 2024-07-08 PROCEDURE — 97110 THERAPEUTIC EXERCISES: CPT | Mod: GP | Performed by: PHYSICAL THERAPIST

## 2024-07-08 NOTE — PROGRESS NOTES
"Physical Therapy                                                                                  Physical Therapy Treatment       Patient name: Shant Rivera  MRN:   [id number]  Today's Date: 7/8/24     Time Calculation  Start Time: 0929  Stop Time: 0955  Time Calculation (min): 26 min  1. Decreased range of motion of right shoulder        2. Weakness of right shoulder        3. Shoulder subluxation, unspecified laterality, subsequent encounter  Follow Up In Physical Therapy          PT  Visit  PT Received On: 07/08/24  General  Reason for Referral: R shoulder scope with biceps tenodesis  Referred By: Abbi Sanchez PA-C  Past Medical History Relevant to Rehab: HTN  General Comment: $500 DED (MET)/ 80% COVERAGE/ NO AUTH/ 20 VISITS/ - Onset Surgery date 4/25/24, Visit 5  Assessment:  Patient completed session today with minimal effort and had difficulty with ER with fatigue and remained with orange band today.  Patient was able to increase to green with all other exercises.  No increase in pain with activity today and patient presents with good form.    Plan:        Monitor response to treatment and adjust plan as needed.   Patient has appointment with surgeon next week.  Patient will call if physician is ok with discharge.       Subjective:  Patient reported that he has soreness at times and is icing it when it is sore.   No pain at this time.       PT  Visit  PT Received On: 07/08/24  Performing HEP: yes - orange  Precautions  Precautions Comment: Per Dr. Acharya's note from 6/10/24: \"Continue PT and start progressive strengthening of the biceps.\"  Pain    Treatment:    Therapeutic Exercise:   26 MINUTES  Therapeutic exercises completed this date to improve strength and postural control to improve ability to perform functional activities safely.         -Supine B Shoulder horizontal abduction with green theraband 2 x 10 reps palms down and palms up each  Standing:   - B hammer (brachioradialis) curls with B 2# hand " "weights 2 x 10 reps   - B brachialis curls (pronated) with B 2# hand weights 2 x 10 reps   - B biceps curl palm up with 2# hand weights 2 x 10 reps  - B shoulder flexion \"I\" to 90 deg with B 2# hand weights 2 x 10 reps, 3-5 sec hold   - B shoulder scaption \"Y\" to 90 degrees with 2# hand weights 2 x 10 reps  - tricep extension with green band 2 x 15   - Shoulder ER with orange band 2 x 15  - Shoulder IR with green band  2 x 15  -bicep curls with green tband 2 x 15   -wall push up plus 2 x 10   - shoulder circles on wall 2 x 10       Education:     Access Code: [email or web address]  URL: [email or web address]  Date: 07/08/2024  Prepared by: Elizabeth Whyte    Program Notes  Complete all exercises in pain free range of motion. Hold any exercise from program that causes increased discomfort. Continue with low weight but increase repetitions    Exercises  - Standing Bicep Curls Supinated with Dumbbells  - 1-2 x daily - 7 x weekly - 2 sets - 10 - 20 reps  - Standing Elbow Extension with Self-Anchored Resistance  - 1 x daily - 7 x weekly - 2 sets - 10 -20 reps  - Shoulder Internal Rotation with Resistance  - 1 x daily - 7 x weekly - 2 sets - 10-20 reps  - Shoulder External Rotation with Anchored Resistance  - 1 x daily - 7 x weekly - 2 sets - 10 - 20 reps  - Standing Elbow Flexion with Self-Anchored Resistance  - 1 x daily - 7 x weekly - 2 sets - 10 - 20 reps  - Supine Shoulder Horizontal Abduction with Resistance  - 1 x daily - 7 x weekly - 2 sets - 15-20 reps  - Standing Shoulder Flexion to 90 Degrees with Dumbbells  - 1 x daily - 7 x weekly - 2 sets - 15-20 reps  - Scaption with Dumbbells  - 1 x daily - 7 x weekly - 2 sets - 15-20 reps  - Wall Push Up with Plus  - 1 x daily - 7 x weekly - 2 sets - 10 reps  - Standing Wall Ball Circles with Mini Swiss Ball  - 1 x daily - 7 x weekly - 3 sets - 10 reps    Elizabeth Whyte, PT    Active       PT Problem       Patient will demonstrate improved R shoulder strength to be " able to move R UE against full ROM       Start:  05/10/24    Expected End:  07/05/24            Patient will demonstrate improved shoulder ROM to WFL to allow for ability to return to daily activities without compensation       Start:  05/10/24    Expected End:  07/05/24            Patient will be able to perform all daily activities without reports of pain        Start:  05/10/24    Expected End:  07/05/24            Patient will demonstrate improved score on Quick DASH  by 10 points to demonstrate improved subjective functional mobility and ability to perform daily activities.          Start:  05/10/24    Expected End:  07/05/24            Patient will demonstrate independence and compliance with safe and appropriate HEP for pain reduction, ROM/flexibility, and strength       Start:  05/10/24    Expected End:  07/05/24

## 2024-07-11 ENCOUNTER — APPOINTMENT (OUTPATIENT)
Dept: PHYSICAL THERAPY | Facility: CLINIC | Age: 49
End: 2024-07-11
Payer: COMMERCIAL

## 2024-07-15 ENCOUNTER — APPOINTMENT (OUTPATIENT)
Dept: PHYSICAL THERAPY | Facility: CLINIC | Age: 49
End: 2024-07-15
Payer: COMMERCIAL

## 2024-07-15 ENCOUNTER — OFFICE VISIT (OUTPATIENT)
Dept: ORTHOPEDIC SURGERY | Facility: HOSPITAL | Age: 49
End: 2024-07-15
Payer: COMMERCIAL

## 2024-07-15 DIAGNOSIS — S43.003A SUBLUXATION OF TENDON OF LONG HEAD OF BICEPS: Primary | ICD-10-CM

## 2024-07-15 PROCEDURE — 99024 POSTOP FOLLOW-UP VISIT: CPT | Performed by: ORTHOPAEDIC SURGERY

## 2024-07-15 PROCEDURE — 99212 OFFICE O/P EST SF 10 MIN: CPT | Performed by: ORTHOPAEDIC SURGERY

## 2024-07-15 NOTE — PROGRESS NOTES
Left biceps tenodesis he is doing very well.    Right shoulder full range of motion motor power in elbow flexion 5/5 with no pain.    Bicipital tendinopathy    Patient is doing very well.  He may pursue activities as tolerated and begin golfing and playing pickle ball.  Follow-up as needed.    This was dictated using voice recognition software and not corrected for grammatical or spelling errors.

## 2024-07-18 ENCOUNTER — DOCUMENTATION (OUTPATIENT)
Dept: PHYSICAL THERAPY | Facility: CLINIC | Age: 49
End: 2024-07-18
Payer: COMMERCIAL

## 2024-07-18 NOTE — PROGRESS NOTES
Physical Therapy                 Therapy Communication Note    Patient Name: Shant Rivera  MRN: [id number]  Today's Date: 7/18/2024     Discipline: Physical Therapy    Comment:  Patient did not show for appointment, PT called and left message to call back to discuss.  Last session PT had discussed with patient possible discharge if cleared by physician.

## 2024-07-22 ENCOUNTER — APPOINTMENT (OUTPATIENT)
Dept: PHYSICAL THERAPY | Facility: CLINIC | Age: 49
End: 2024-07-22
Payer: COMMERCIAL

## 2024-07-22 DIAGNOSIS — R29.898 WEAKNESS OF RIGHT SHOULDER: ICD-10-CM

## 2024-07-22 DIAGNOSIS — M25.611 DECREASED RANGE OF MOTION OF RIGHT SHOULDER: Primary | ICD-10-CM

## 2024-07-22 DIAGNOSIS — S43.003D: ICD-10-CM

## 2024-07-22 NOTE — PROGRESS NOTES
"Physical Therapy                                                                                  Physical Therapy Treatment/recheck/discharge       Patient name: Shant Rivera  MRN:   [id number]  Today's Date: 7/22/24  6         1. Decreased range of motion of right shoulder        2. Weakness of right shoulder        3. Shoulder subluxation, unspecified laterality, subsequent encounter                  Assessment:  Patient is a 48  year old who has had 6 sessions of Physical Therapy with focus on addressing shoulder ROM and strength s/p R shoulder scope.  Currently patient has demonstrated progress with the following: *** Patient continues to have difficulty with the following *** Secondary to the demonstrated progress the patient will continue to benefit from skilled PT intervention to continue to address through skilled PT to allow patient to work towards goals of ***       Plan:        Monitor response to treatment and adjust plan as needed.   To continue with current POC with emphasis on ***.           Subjective:  Patient reported ***.     Response to last session:     Performing HEP: ***     Pain     Objective:     Treatment:    ***  -Supine B Shoulder horizontal abduction with green theraband 2 x 10 reps palms down and palms up each  Standing:   - B hammer (brachioradialis) curls with B 2# hand weights 2 x 10 reps   - B brachialis curls (pronated) with B 2# hand weights 2 x 10 reps   - B biceps curl palm up with 2# hand weights 2 x 10 reps  - B shoulder flexion \"I\" to 90 deg with B 2# hand weights 2 x 10 reps, 3-5 sec hold   - B shoulder scaption \"Y\" to 90 degrees with 2# hand weights 2 x 10 reps  - tricep extension with green band 2 x 15   - Shoulder ER with orange band 2 x 15  - Shoulder IR with green band  2 x 15  -bicep curls with green tband 2 x 15   -wall push up plus 2 x 10   - shoulder circles on wall 2 x 10   Education:   Access Code: [email or web address]     Elizabeth Whyte, PT    Active       PT Problem       " Patient will demonstrate improved R shoulder strength to be able to move R UE against full ROM       Start:  05/10/24    Expected End:  07/05/24            Patient will demonstrate improved shoulder ROM to WFL to allow for ability to return to daily activities without compensation       Start:  05/10/24    Expected End:  07/05/24            Patient will be able to perform all daily activities without reports of pain        Start:  05/10/24    Expected End:  07/05/24            Patient will demonstrate improved score on Quick DASH  by 10 points to demonstrate improved subjective functional mobility and ability to perform daily activities.          Start:  05/10/24    Expected End:  07/05/24            Patient will demonstrate independence and compliance with safe and appropriate HEP for pain reduction, ROM/flexibility, and strength       Start:  05/10/24    Expected End:  07/05/24

## 2024-08-13 ENCOUNTER — DOCUMENTATION (OUTPATIENT)
Dept: PHYSICAL THERAPY | Facility: CLINIC | Age: 49
End: 2024-08-13
Payer: COMMERCIAL

## 2024-08-13 NOTE — PROGRESS NOTES
Physical Therapy    Discharge Summary    Name: Shant Rivera  MRN: [id number]  : 1975  Date: 24    Discharge Summary: PT    Discharge Information: Date of discharge 24, Date of last visit 24, Date of evaluation 5/10/24, Number of attended visits 5, Referred by Abbi Sanchez, and Referred for R shoulder scope     Therapy Summary: Patient was evaluated and treated s/p biceps tenodesis.  Patient was progressing very well and independent with HEP.     Discharge Status: Patient was returning to physician and then cancelled remaining PT visits.     Rehab Discharge Reason: Achieved all and/or the most significant goals(s)

## 2024-09-18 DIAGNOSIS — I10 HYPERTENSION, UNSPECIFIED TYPE: ICD-10-CM

## 2024-09-18 RX ORDER — AMLODIPINE BESYLATE 10 MG/1
10 TABLET ORAL DAILY
Qty: 90 TABLET | Refills: 0 | Status: SHIPPED | OUTPATIENT
Start: 2024-09-18

## 2024-09-18 RX ORDER — LISINOPRIL 10 MG/1
10 TABLET ORAL DAILY
Qty: 90 TABLET | Refills: 0 | Status: SHIPPED | OUTPATIENT
Start: 2024-09-18

## 2024-09-20 ENCOUNTER — APPOINTMENT (OUTPATIENT)
Dept: UROLOGY | Facility: HOSPITAL | Age: 49
End: 2024-09-20
Payer: COMMERCIAL

## 2024-10-15 ENCOUNTER — APPOINTMENT (OUTPATIENT)
Dept: SURGERY | Facility: CLINIC | Age: 49
End: 2024-10-15
Payer: COMMERCIAL

## 2024-10-17 DIAGNOSIS — R35.0 URINARY FREQUENCY: ICD-10-CM

## 2024-10-17 DIAGNOSIS — N40.1 BENIGN LOCALIZED PROSTATIC HYPERPLASIA WITH LOWER URINARY TRACT SYMPTOMS (LUTS): ICD-10-CM

## 2024-10-17 RX ORDER — TADALAFIL 5 MG/1
5 TABLET ORAL DAILY
Qty: 30 TABLET | Refills: 11 | Status: SHIPPED | OUTPATIENT
Start: 2024-10-17 | End: 2025-10-12

## 2024-11-07 NOTE — PROGRESS NOTES
HPI    49 y.o. male being seen with the following problem list:    Problem list:  LUTS  ED     LUTS  Urinary symptoms include: frequency, no hematuria, no UTIs, and unhappy with his urinary symptoms  No prior surgeries  No urinary medications.  He does drink a lot of water up to bedtime. Nocturia x3-4.     Erections: not working well, naive to meds;  has noted issues since his vasectomy 8 years ago.     Stones   No prior stones     Prostate Cancer Screening:   No family history of prostate cancer     11/10/23 - seen in consult. Started on cialis 5mg for bph and ed. Discussed limiting fluids in the evening     2/22/24 - seen in follow up after started on tadalafil 5mg daily. urination is better. Urgency better, nocturia better. Erections are much better. Would like to come in for exam     3/21/24 - seen today for exam, per patients request. Doing very well, no interval changes. History of hemorrhoid removal a few years ago, having pain in the anal area    11/13/24 - Seen today over telehealth, performed this visit using real-time telehealth tools, including an audio/video connection between Shant Rivera at home and Soy Orozco MD at [facility]. Consent for telemedicine visit was obtained. Doing well, urinary symptoms have significantly improved on Cialis 5mg. Nocturia, has swelling in legs by the end of the day. Has an anal fissure, was suppose to undergo surgery but was given creams which helped a lot. Family history of prostate ca, grandfather passed away from Pca.           Current Medications:  Current Outpatient Medications   Medication Sig Dispense Refill    amLODIPine (Norvasc) 10 mg tablet Take 1 tablet (10 mg) by mouth once daily. 90 tablet 0    hydroCHLOROthiazide (Microzide) 12.5 mg tablet Take 1 tablet (12.5 mg) by mouth once daily. 90 tablet 3    ibuprofen 800 mg tablet Take 1 tablet (800 mg) by mouth every 6 hours if needed for mild pain (1 - 3). 60 tablet 3    lisinopril 10 mg tablet  Take 1 tablet (10 mg) by mouth once daily. 90 tablet 0    psyllium (Metamucil) 3.4 gram packet Take 1 packet by mouth once daily.      tadalafil (Cialis) 5 mg tablet Take 1 tablet (5 mg) by mouth once daily. 30 tablet 11     No current facility-administered medications for this visit.        Active Problems:  Shant Rivera is a 49 y.o. male with the following Problems and Medications.  Patient Active Problem List   Diagnosis    1st MTP arthritis    Abdominal pain    ABM (anterior basement membrane) dystrophy    ADHD    Ankle joint pain    Anxiety    Atypical chest pain    Edgar's esophagus    Bleeding hemorrhoids    Body aches    Burning mouth syndrome    Burning tongue    Chronic GERD    Corneal scar, left eye    Dermatitis    Diarrhea    Foot pain    Golfers elbow of right upper extremity    Hemorrhoid    HLD (hyperlipidemia)    Hyperopia with presbyopia of both eyes    Hypertension    Hypertriglyceridemia    Irritable bowel syndrome with diarrhea    Low back pain    Myopia, bilateral    Shoulder pain    Skin lesion    Synovitis of toe    Tinea corporis    Varicocele    Shoulder subluxation, unspecified laterality, subsequent encounter    Decreased range of motion (ROM) of shoulder    Weakness of right shoulder    Perineum pain, male     Current Outpatient Medications   Medication Sig Dispense Refill    amLODIPine (Norvasc) 10 mg tablet Take 1 tablet (10 mg) by mouth once daily. 90 tablet 0    hydroCHLOROthiazide (Microzide) 12.5 mg tablet Take 1 tablet (12.5 mg) by mouth once daily. 90 tablet 3    ibuprofen 800 mg tablet Take 1 tablet (800 mg) by mouth every 6 hours if needed for mild pain (1 - 3). 60 tablet 3    lisinopril 10 mg tablet Take 1 tablet (10 mg) by mouth once daily. 90 tablet 0    psyllium (Metamucil) 3.4 gram packet Take 1 packet by mouth once daily.      tadalafil (Cialis) 5 mg tablet Take 1 tablet (5 mg) by mouth once daily. 30 tablet 11     No current facility-administered medications for this  visit.       PMH:  Past Medical History:   Diagnosis Date    ADHD (attention deficit hyperactivity disorder)     Anxiety     Asthma ([facility])     Edgar's esophagus     BPH (benign prostatic hyperplasia)     GERD (gastroesophageal reflux disease)     HTN (hypertension)     Hypertriglyceridemia     Unspecified corneal scar and opacity     Corneal scar, left eye       PSH:  Past Surgical History:   Procedure Laterality Date    GASTRIC FUNDOPLICATION  09/03/2014    Esophagogastric Fundoplasty Nissen Fundoplication    HERNIA REPAIR  09/03/2014    Inguinal Hernia Repair    OTHER SURGICAL HISTORY  11/16/2021    Strabismus surgery       FMH:  Family History   Problem Relation Name Age of Onset    Breast cancer Mother      Brain cancer Father      Prostate cancer Maternal Grandfather      Esophageal cancer Paternal Grandmother         SHx:  Social History     Tobacco Use    Smoking status: Former     Current packs/day: 1.00     Average packs/day: 1 pack/day for 10.0 years (10.0 ttl pk-yrs)     Types: Cigarettes    Smokeless tobacco: Never   Vaping Use    Vaping status: Never Used   Substance Use Topics    Alcohol use: Yes     Alcohol/week: 21.0 standard drinks of alcohol     Types: 21 Standard drinks or equivalent per week     Comment: 2-3 daily drinks    Drug use: Never       Allergies:  Allergies   Allergen Reactions    Penicillins Unknown         Assessment/Plan  Doing well overall, urinary symptoms and ED improved on Cialis 5mg. Nocturia, has swelling in legs during the end of the day. Advised to limit fluids in the evening, also raise his legs before going to bed to decrease the amount of fluid in them to reduce nocturia.     Given his family history of PCa, grandfather passed away from PCa, will check his PSA.     Follow up in 1 month over [facility] to review PSA.     Scribe Attestation  By signing my name below, Leobardo ROMEO Scribe, attest that this documentation  has been prepared under the direction and in the  presence of Soy Orozco MD.

## 2024-11-13 ENCOUNTER — TELEPHONE (OUTPATIENT)
Dept: UROLOGY | Facility: HOSPITAL | Age: 49
End: 2024-11-13

## 2024-11-13 ENCOUNTER — TELEMEDICINE (OUTPATIENT)
Dept: UROLOGY | Facility: HOSPITAL | Age: 49
End: 2024-11-13
Payer: COMMERCIAL

## 2024-11-13 DIAGNOSIS — N52.9 ERECTILE DYSFUNCTION, UNSPECIFIED ERECTILE DYSFUNCTION TYPE: Primary | ICD-10-CM

## 2024-11-13 DIAGNOSIS — R35.0 BENIGN PROSTATIC HYPERPLASIA WITH URINARY FREQUENCY: ICD-10-CM

## 2024-11-13 DIAGNOSIS — R35.1 NOCTURIA: ICD-10-CM

## 2024-11-13 DIAGNOSIS — Z12.5 PROSTATE CANCER SCREENING: ICD-10-CM

## 2024-11-13 DIAGNOSIS — N40.1 BENIGN PROSTATIC HYPERPLASIA WITH URINARY FREQUENCY: ICD-10-CM

## 2024-11-13 PROCEDURE — G2211 COMPLEX E/M VISIT ADD ON: HCPCS | Performed by: UROLOGY

## 2024-11-13 PROCEDURE — 99214 OFFICE O/P EST MOD 30 MIN: CPT | Performed by: UROLOGY

## 2024-12-18 DIAGNOSIS — I10 HYPERTENSION, UNSPECIFIED TYPE: ICD-10-CM

## 2024-12-18 RX ORDER — AMLODIPINE BESYLATE 10 MG/1
10 TABLET ORAL DAILY
Qty: 90 TABLET | Refills: 2 | Status: SHIPPED | OUTPATIENT
Start: 2024-12-18

## 2024-12-18 NOTE — PROGRESS NOTES
HPI    49 y.o. male being seen with the following problem list:    Problem list:  LUTS  ED     LUTS  Urinary symptoms include: frequency, no hematuria, no UTIs, and unhappy with his urinary symptoms  No prior surgeries  No urinary medications.  He does drink a lot of water up to bedtime. Nocturia x3-4.     Erections: not working well, naive to meds;  has noted issues since his vasectomy 8 years ago.     Stones   No prior stones     Prostate Cancer Screening:   No family history of prostate cancer     11/10/23 - seen in consult. Started on cialis 5mg for bph and ed. Discussed limiting fluids in the evening     2/22/24 - seen in follow up after started on tadalafil 5mg daily. urination is better. Urgency better, nocturia better. Erections are much better. Would like to come in for exam     3/21/24 - seen today for exam, per patients request. Doing very well, no interval changes. History of hemorrhoid removal a few years ago, having pain in the anal area     11/13/24 - Seen today over telehealth, performed this visit using real-time telehealth tools, including an audio/video connection between Shant Rivera at home and Soy Orozco MD at [facility]. Consent for telemedicine visit was obtained. Doing well, urinary symptoms have significantly improved on Cialis 5mg. Nocturia, has swelling in legs by the end of the day. Has an anal fissure, was suppose to undergo surgery but was given creams which helped a lot. Family history of prostate ca, grandfather passed away from Pca.     12/19/24 - Seen today over telehealth, performed this visit using real-time telehealth tools, including an audio/video connection between Shant Rivera at home and Soy Orozco MD at [facility]. Consent for telemedicine visit was obtained.  PSA***             Current Medications:  Current Outpatient Medications   Medication Sig Dispense Refill    amLODIPine (Norvasc) 10 mg tablet Take 1 tablet (10 mg) by mouth once daily.  90 tablet 2    hydroCHLOROthiazide (Microzide) 12.5 mg tablet Take 1 tablet (12.5 mg) by mouth once daily. 90 tablet 3    ibuprofen 800 mg tablet Take 1 tablet (800 mg) by mouth every 6 hours if needed for mild pain (1 - 3). 60 tablet 3    lisinopril 10 mg tablet Take 1 tablet (10 mg) by mouth once daily. 90 tablet 0    psyllium (Metamucil) 3.4 gram packet Take 1 packet by mouth once daily.      tadalafil (Cialis) 5 mg tablet Take 1 tablet (5 mg) by mouth once daily. 30 tablet 11     No current facility-administered medications for this visit.        Active Problems:  Shant Rivera is a 49 y.o. male with the following Problems and Medications.  Patient Active Problem List   Diagnosis    1st MTP arthritis    Abdominal pain    ABM (anterior basement membrane) dystrophy    ADHD    Ankle joint pain    Anxiety    Atypical chest pain    Edgar's esophagus    Bleeding hemorrhoids    Body aches    Burning mouth syndrome    Burning tongue    Chronic GERD    Corneal scar, left eye    Dermatitis    Diarrhea    Foot pain    Golfers elbow of right upper extremity    Hemorrhoid    HLD (hyperlipidemia)    Hyperopia with presbyopia of both eyes    Hypertension    Hypertriglyceridemia    Irritable bowel syndrome with diarrhea    Low back pain    Myopia, bilateral    Shoulder pain    Skin lesion    Synovitis of toe    Tinea corporis    Varicocele    Shoulder subluxation, unspecified laterality, subsequent encounter    Decreased range of motion (ROM) of shoulder    Weakness of right shoulder    Perineum pain, male    Prostate cancer screening    Nocturia    Erectile dysfunction    Benign prostatic hyperplasia with urinary frequency     Current Outpatient Medications   Medication Sig Dispense Refill    amLODIPine (Norvasc) 10 mg tablet Take 1 tablet (10 mg) by mouth once daily. 90 tablet 2    hydroCHLOROthiazide (Microzide) 12.5 mg tablet Take 1 tablet (12.5 mg) by mouth once daily. 90 tablet 3    ibuprofen 800 mg tablet Take 1  tablet (800 mg) by mouth every 6 hours if needed for mild pain (1 - 3). 60 tablet 3    lisinopril 10 mg tablet Take 1 tablet (10 mg) by mouth once daily. 90 tablet 0    psyllium (Metamucil) 3.4 gram packet Take 1 packet by mouth once daily.      tadalafil (Cialis) 5 mg tablet Take 1 tablet (5 mg) by mouth once daily. 30 tablet 11     No current facility-administered medications for this visit.       PMH:  Past Medical History:   Diagnosis Date    ADHD (attention deficit hyperactivity disorder)     Anxiety     Asthma ([facility])     Edgar's esophagus     BPH (benign prostatic hyperplasia)     GERD (gastroesophageal reflux disease)     HTN (hypertension)     Hypertriglyceridemia     Unspecified corneal scar and opacity     Corneal scar, left eye       PSH:  Past Surgical History:   Procedure Laterality Date    GASTRIC FUNDOPLICATION  09/03/2014    Esophagogastric Fundoplasty Nissen Fundoplication    HERNIA REPAIR  09/03/2014    Inguinal Hernia Repair    OTHER SURGICAL HISTORY  11/16/2021    Strabismus surgery       FMH:  Family History   Problem Relation Name Age of Onset    Breast cancer Mother      Brain cancer Father      Prostate cancer Maternal Grandfather      Esophageal cancer Paternal Grandmother         SHx:  Social History     Tobacco Use    Smoking status: Former     Current packs/day: 1.00     Average packs/day: 1 pack/day for 10.0 years (10.0 ttl pk-yrs)     Types: Cigarettes    Smokeless tobacco: Never   Vaping Use    Vaping status: Never Used   Substance Use Topics    Alcohol use: Yes     Alcohol/week: 21.0 standard drinks of alcohol     Types: 21 Standard drinks or equivalent per week     Comment: 2-3 daily drinks    Drug use: Never       Allergies:  Allergies   Allergen Reactions    Penicillins Unknown       Physical Exam:      Assessment/Plan      Scribe Attestation  By signing my name below, Leobardo ROMEO, Bria, attest that this documentation has been prepared under the direction and in the  presence of Soy Orozco MD.

## 2024-12-19 ENCOUNTER — APPOINTMENT (OUTPATIENT)
Dept: UROLOGY | Facility: HOSPITAL | Age: 49
End: 2024-12-19
Payer: COMMERCIAL

## 2024-12-19 ENCOUNTER — TELEPHONE (OUTPATIENT)
Dept: UROLOGY | Facility: HOSPITAL | Age: 49
End: 2024-12-19
Payer: COMMERCIAL

## 2024-12-19 NOTE — TELEPHONE ENCOUNTER
Left patient message stating since his PSA blood work wasn't done his THV with Dr. Orozco for 12/19 needs to be rescheduled - rescheduled pt to next available and left him a message with all of this information    Will also be sending out a letter in the mail

## 2025-01-09 ENCOUNTER — APPOINTMENT (OUTPATIENT)
Dept: PRIMARY CARE | Facility: CLINIC | Age: 50
End: 2025-01-09
Payer: COMMERCIAL

## 2025-02-10 DIAGNOSIS — I10 HYPERTENSION, UNSPECIFIED TYPE: ICD-10-CM

## 2025-02-10 RX ORDER — LISINOPRIL 10 MG/1
10 TABLET ORAL DAILY
Qty: 90 TABLET | Refills: 3 | Status: SHIPPED | OUTPATIENT
Start: 2025-02-10

## 2025-06-02 DIAGNOSIS — I15.9 SECONDARY HYPERTENSION: ICD-10-CM

## 2025-06-02 RX ORDER — HYDROCHLOROTHIAZIDE 12.5 MG/1
12.5 TABLET ORAL DAILY
Qty: 90 TABLET | Refills: 0 | Status: SHIPPED | OUTPATIENT
Start: 2025-06-02

## 2025-08-28 DIAGNOSIS — I15.9 SECONDARY HYPERTENSION: ICD-10-CM

## 2025-08-28 RX ORDER — HYDROCHLOROTHIAZIDE 12.5 MG/1
12.5 TABLET ORAL DAILY
Qty: 90 TABLET | Refills: 0 | Status: SHIPPED | OUTPATIENT
Start: 2025-08-28

## (undated) DEVICE — TUBING, SUCTION, 6MM X 10, CLEAN N-COND

## (undated) DEVICE — BLANKET, LOWER BODY, VHA PLUS, ADULT

## (undated) DEVICE — TUBING, ARTHROSCOPIC INFLOW, 10K

## (undated) DEVICE — Device

## (undated) DEVICE — DRESSING, TRANSPARENT, TEGADERM, FRAME STYLE, 4 X 4.5, STRL

## (undated) DEVICE — SLING, ARM, LARGE

## (undated) DEVICE — PROBE, APOLLO RF, 90 DEG, EXTRA LARTGE

## (undated) DEVICE — SUTURE, ETHILON, 3-0, 18 IN, PS1, BLACK

## (undated) DEVICE — DRESSING, GAUZE, SPONGE, KERLIX, SUPER, 6 X 6.75 IN, STERILE 10PK

## (undated) DEVICE — PREP TRAY, BASIC

## (undated) DEVICE — PREP, SKIN, BETADINE, SOLUTION, 16 OZ

## (undated) DEVICE — PREP, SKIN, BETADINE, SCRUB, 32 OZ

## (undated) DEVICE — DRESSING, ABDOMINAL, TENDERSORB, 8 X 7-1/2 IN, STERILE

## (undated) DEVICE — SUTURE, VICRYL, 3-0, 27 IN, SH, VIOLET

## (undated) DEVICE — GLOVE, SURGICAL, PROTEXIS PI ORTHO, 8.0, PF, LF

## (undated) DEVICE — DRESSING, GAUZE, PETROLATUM, PATCH, XEROFORM, 1 X 8 IN, STERILE

## (undated) DEVICE — MASK, FACE, TENET, FOAM POSITIONING, DISPOSABLE

## (undated) DEVICE — GLOVE, SURGICAL, PROTEXIS PI , 8.0, PF, LF

## (undated) DEVICE — DRESSING, GAUZE, FLUFF, 1 PLY, 18 X 36 IN